# Patient Record
Sex: FEMALE | Race: BLACK OR AFRICAN AMERICAN | NOT HISPANIC OR LATINO | Employment: FULL TIME | URBAN - METROPOLITAN AREA
[De-identification: names, ages, dates, MRNs, and addresses within clinical notes are randomized per-mention and may not be internally consistent; named-entity substitution may affect disease eponyms.]

---

## 2019-03-18 ENCOUNTER — OFFICE VISIT (OUTPATIENT)
Dept: FAMILY MEDICINE | Facility: CLINIC | Age: 34
End: 2019-03-18
Payer: COMMERCIAL

## 2019-03-18 VITALS
OXYGEN SATURATION: 98 % | SYSTOLIC BLOOD PRESSURE: 140 MMHG | RESPIRATION RATE: 18 BRPM | WEIGHT: 150.3 LBS | BODY MASS INDEX: 28.37 KG/M2 | HEART RATE: 114 BPM | HEIGHT: 61 IN | DIASTOLIC BLOOD PRESSURE: 100 MMHG | TEMPERATURE: 99.7 F

## 2019-03-18 DIAGNOSIS — N91.2 AMENORRHEA: ICD-10-CM

## 2019-03-18 DIAGNOSIS — R74.8 ELEVATED LIVER ENZYMES: ICD-10-CM

## 2019-03-18 DIAGNOSIS — F17.200 SMOKING ADDICTION: ICD-10-CM

## 2019-03-18 DIAGNOSIS — G47.30 SLEEP APNEA, UNSPECIFIED TYPE: ICD-10-CM

## 2019-03-18 DIAGNOSIS — F41.9 ANXIETY: ICD-10-CM

## 2019-03-18 DIAGNOSIS — I10 ESSENTIAL HYPERTENSION: ICD-10-CM

## 2019-03-18 DIAGNOSIS — R42 DIZZINESS: Primary | ICD-10-CM

## 2019-03-18 DIAGNOSIS — F10.10 ALCOHOL ABUSE: ICD-10-CM

## 2019-03-18 DIAGNOSIS — L30.9 DERMATITIS: ICD-10-CM

## 2019-03-18 DIAGNOSIS — E66.3 OVERWEIGHT: ICD-10-CM

## 2019-03-18 DIAGNOSIS — E78.2 MIXED HYPERLIPIDEMIA: ICD-10-CM

## 2019-03-18 LAB
ALBUMIN SERPL-MCNC: 4.4 G/DL (ref 3.4–5)
ALP SERPL-CCNC: 37 U/L (ref 40–150)
ALT SERPL W P-5'-P-CCNC: 119 U/L (ref 0–50)
ANION GAP SERPL CALCULATED.3IONS-SCNC: 11 MMOL/L (ref 3–14)
AST SERPL W P-5'-P-CCNC: 158 U/L (ref 0–45)
B-HCG SERPL-ACNC: <1 IU/L (ref 0–5)
BILIRUB SERPL-MCNC: 0.5 MG/DL (ref 0.2–1.3)
BUN SERPL-MCNC: 6 MG/DL (ref 7–30)
CALCIUM SERPL-MCNC: 9.7 MG/DL (ref 8.5–10.1)
CHLORIDE SERPL-SCNC: 95 MMOL/L (ref 94–109)
CHOLEST SERPL-MCNC: 249 MG/DL
CO2 SERPL-SCNC: 29 MMOL/L (ref 20–32)
CREAT SERPL-MCNC: 0.7 MG/DL (ref 0.52–1.04)
ERYTHROCYTE [DISTWIDTH] IN BLOOD BY AUTOMATED COUNT: 12.7 % (ref 10–15)
GFR SERPL CREATININE-BSD FRML MDRD: >90 ML/MIN/{1.73_M2}
GLUCOSE SERPL-MCNC: 87 MG/DL (ref 70–99)
HCT VFR BLD AUTO: 40 % (ref 35–47)
HDLC SERPL-MCNC: 84 MG/DL
HGB BLD-MCNC: 13.4 G/DL (ref 11.7–15.7)
LDLC SERPL CALC-MCNC: 152 MG/DL
MCH RBC QN AUTO: 34.4 PG (ref 26.5–33)
MCHC RBC AUTO-ENTMCNC: 33.5 G/DL (ref 31.5–36.5)
MCV RBC AUTO: 103 FL (ref 78–100)
NONHDLC SERPL-MCNC: 165 MG/DL
PLATELET # BLD AUTO: 113 10E9/L (ref 150–450)
POTASSIUM SERPL-SCNC: 3.9 MMOL/L (ref 3.4–5.3)
PROT SERPL-MCNC: 8.9 G/DL (ref 6.8–8.8)
RBC # BLD AUTO: 3.9 10E12/L (ref 3.8–5.2)
SODIUM SERPL-SCNC: 135 MMOL/L (ref 133–144)
TRIGL SERPL-MCNC: 66 MG/DL
VIT B12 SERPL-MCNC: 789 PG/ML (ref 193–986)
WBC # BLD AUTO: 4 10E9/L (ref 4–11)

## 2019-03-18 PROCEDURE — 99204 OFFICE O/P NEW MOD 45 MIN: CPT | Performed by: NURSE PRACTITIONER

## 2019-03-18 PROCEDURE — 84702 CHORIONIC GONADOTROPIN TEST: CPT | Performed by: NURSE PRACTITIONER

## 2019-03-18 PROCEDURE — 80053 COMPREHEN METABOLIC PANEL: CPT | Performed by: NURSE PRACTITIONER

## 2019-03-18 PROCEDURE — 82607 VITAMIN B-12: CPT | Performed by: NURSE PRACTITIONER

## 2019-03-18 PROCEDURE — 85027 COMPLETE CBC AUTOMATED: CPT | Performed by: NURSE PRACTITIONER

## 2019-03-18 PROCEDURE — 36415 COLL VENOUS BLD VENIPUNCTURE: CPT | Performed by: NURSE PRACTITIONER

## 2019-03-18 PROCEDURE — 80061 LIPID PANEL: CPT | Performed by: NURSE PRACTITIONER

## 2019-03-18 RX ORDER — DESONIDE 0.5 MG/G
OINTMENT TOPICAL
Refills: 1 | COMMUNITY
Start: 2018-03-20 | End: 2019-03-18

## 2019-03-18 RX ORDER — ACETAMINOPHEN AND CODEINE PHOSPHATE 120; 12 MG/5ML; MG/5ML
SOLUTION ORAL
COMMUNITY
Start: 2018-03-08 | End: 2019-03-25

## 2019-03-18 RX ORDER — AMLODIPINE BESYLATE 5 MG/1
5 TABLET ORAL
COMMUNITY
Start: 2017-05-05 | End: 2019-03-25

## 2019-03-18 RX ORDER — HYDROCHLOROTHIAZIDE 25 MG/1
25 TABLET ORAL
COMMUNITY
End: 2019-03-25

## 2019-03-18 RX ORDER — TRIAMCINOLONE ACETONIDE 0.25 MG/G
OINTMENT TOPICAL 2 TIMES DAILY
Qty: 80 G | Refills: 1 | Status: SHIPPED | OUTPATIENT
Start: 2019-03-18 | End: 2020-07-27 | Stop reason: DRUGHIGH

## 2019-03-18 RX ORDER — ONDANSETRON 4 MG/1
TABLET, ORALLY DISINTEGRATING ORAL
Refills: 0 | COMMUNITY
Start: 2019-01-31 | End: 2019-08-19

## 2019-03-18 RX ORDER — CALCIPOTRIENE 50 UG/G
CREAM TOPICAL
COMMUNITY
Start: 2018-03-12 | End: 2019-03-18

## 2019-03-18 ASSESSMENT — MIFFLIN-ST. JEOR: SCORE: 1334.14

## 2019-03-18 ASSESSMENT — PAIN SCALES - GENERAL: PAINLEVEL: NO PAIN (0)

## 2019-03-18 NOTE — PATIENT INSTRUCTIONS
Check BP 2-3 times a week, call/return to clinic if consistently > 140/90    Work on cutting back on alcohol intake to 2-3 times during the week, on the weekends try to cut back at least a few drinks each night/day. Work together with your  if you both are on track and wanting to quit. Start walking outside, getting some exercise/fresh air to help keep your mind off drinking. The [facility] referral service will call in a few business days to talk about cutting back and getting help.     There is also AA or other resources in the community. [facility] can also offer other resources.

## 2019-03-18 NOTE — PROGRESS NOTES
SUBJECTIVE:   Srinath Stewrat is a 33 year old female who presents to clinic today for the following health issues:      New Patient/Transfer of Care    Concern - Shaky/Vomiting  Onset: intermittent but happened this saturday    Description: had drank the night before and the next day was vomiting, sweating, twitching    Intensity: moderate    Progression of Symptoms:  improving    Accompanying Signs & Symptoms:  Lightheaded, decrease in appetite, bilateral pain in lower abdomen    Previous history of similar problem:   yes    Precipitating factors:   Worsened by: could be from the drinking    Alleviating factors:  Improved by: valium-5 tabs from ER visit on 1/31/19    Therapies Tried and outcome: valium seems to help    Reviewed chart: Was in [place], drank a lot there in january, came back was having nausea/vomiting/'muscle contractions, went to ER 1/31/19-was given 5 tabs of Valium 5 mg.    Last summer had 'episode' where muscles were 'seizing up and twitching'. Went to ER. She got fluids and potassium. Noted to be likely panic attack at that time. She talked to her PCP then, her liver was 'fatty'. She notes sometimes after heavy drinking she feels like this. She ran out of those, that occurred again on 1/31/19. Her insurance changed so can't see her old PCP.     Last Saturday felt shaky, dizzy. Was heavily drinking Friday. Somedays she feels she has to drink alcohol to feel better. During the week will have 2 drinks (or more?) in the evening. Drinks daily-7 days a week. Binge drinking on weekends with . Usually eats dinner with  but not always. Sometimes snacks during the day or has only 1 meal. Not much of an appetite. Drinks [employer] 1.5 L vodka over the weekedn between her  and her.  She denies missing work due to her alcohol intake.    Through her other provider, had MRI, US of abdomen. Has fatty liver.  Also has history of elevated cholesterol.    No cigarettes but vapes.     Elevated BP-is  currently on 2 medications.  Had significant reaction to ACE inhibitors, had significant mouth swelling.  Not eating much, not sleeping much. Sometimes once a day. Sometimes snacks.   Sleep apnea-most nights it helps  Anxiety: not dx but did have panic attack last year.  She also feels her anxiety worsens the day after she has been heavily drinking.    Was seeing dermatology. desnide on skin. Said it was topical dermatitis-she is wondering if there is another medication to start because this 1 does not seem to be working as well now.  She gets a dermatitis around her mouth, on her hands.    Thinks due for pap next year    Feels a little lightheaded today, racing heart, muscle tension. Feels 'off' because she did not drink today. Yesterday she drank heavily.  She states she also tries to stop drinking by 9 PM so does not affect her the next day.    Works for [employer] is . Is busy season.  States work is going with her to get more busy soon.      Problem list and histories reviewed & adjusted, as indicated.  Additional history: as documented    Patient Active Problem List   Diagnosis     Dizziness     Essential hypertension     Alcohol abuse     Mixed hyperlipidemia     Dermatitis     Sleep apnea, unspecified type     Smoking addiction     Anxiety     Elevated liver enzymes     Past Surgical History:   Procedure Laterality Date     HERNIA REPAIR  2005       Social History     Tobacco Use     Smoking status: Never Smoker     Smokeless tobacco: Current User   Substance Use Topics     Alcohol use: Yes     Family History   Problem Relation Age of Onset     Hypertension Father      Other - See Comments Father         brain anyurism dx 2018     Alcoholism Father         hasn't drank since 2018     Depression Maternal Grandmother      Asthma Nephew      Eczema Nephew          Current Outpatient Medications   Medication Sig Dispense Refill     amLODIPine (NORVASC) 5 MG tablet Take 5 mg by mouth        "hydrochlorothiazide (HYDRODIURIL) 25 MG tablet Take 25 mg by mouth       norethindrone (SELVIN) 0.35 MG tablet        ondansetron (ZOFRAN-ODT) 4 MG ODT tab DISSOLVE 1 TABLET ON TONGUE EVERY 8 HOURS AS NEEDED  0     triamcinolone (KENALOG) 0.025 % external ointment Apply topically 2 times daily 80 g 1     Allergies   Allergen Reactions     Ace Inhibitors Swelling     Lisinopril       Reviewed and updated as needed this visit by clinical staff  Tobacco  Allergies  Meds  Problems  Med Hx  Surg Hx  Fam Hx  Soc Hx        Reviewed and updated as needed this visit by Provider  Tobacco  Allergies  Meds  Problems  Med Hx  Surg Hx  Fam Hx         ROS:  Constitutional, HEENT, cardiovascular, pulmonary, gi and gu, psych as above, MS as above, skin as above, systems are negative, except as otherwise noted.    OBJECTIVE:     BP (!) 140/100 (BP Location: Right arm, Patient Position: Sitting, Cuff Size: Adult Regular)   Pulse 114   Temp 99.7  F (37.6  C) (Oral)   Resp 18   Ht 1.549 m (5' 1\")   Wt 68.2 kg (150 lb 4.8 oz)   LMP 02/28/2019   SpO2 98%   BMI 28.40 kg/m    Body mass index is 28.4 kg/m .  GENERAL: healthy, alert and no distress  EYES: Eyes grossly normal to inspection, PERRL and conjunctivae and sclerae normal  HENT: ear canals and TM's normal, nose and mouth without ulcers or lesions  NECK: no adenopathy, no asymmetry, masses, or scars and thyroid normal to palpation  RESP: lungs clear to auscultation - no rales, rhonchi or wheezes  CV: regular rate and rhythm, normal S1 S2, no S3 or S4, no murmur, click or rub  ABDOMEN: soft, slight tenderness lower abdomen, no hepatosplenomegaly, no masses and bowel sounds normal  MS: no gross musculoskeletal defects noted, patient seems slightly shaky  NEURO: Normal strength and tone, mentation intact and speech normal  PSYCH: mentation appears normal, affect normal but states she is anxiety   Skin: Dry patches noted on hands and fingers, slight dry patch noted " around the mouth    Diagnostic Test Results:  Results for orders placed or performed in visit on 03/18/19 (from the past 24 hour(s))   Comprehensive metabolic panel (BMP + Alb, Alk Phos, ALT, AST, Total. Bili, TP)   Result Value Ref Range    Sodium 135 133 - 144 mmol/L    Potassium 3.9 3.4 - 5.3 mmol/L    Chloride 95 94 - 109 mmol/L    Carbon Dioxide 29 20 - 32 mmol/L    Anion Gap 11 3 - 14 mmol/L    Glucose 87 70 - 99 mg/dL    Urea Nitrogen 6 (L) 7 - 30 mg/dL    Creatinine 0.70 0.52 - 1.04 mg/dL    GFR Estimate >90 >60 mL/min/[1.73_m2]    GFR Estimate If Black >90 >60 mL/min/[1.73_m2]    Calcium 9.7 8.5 - 10.1 mg/dL    Bilirubin Total 0.5 0.2 - 1.3 mg/dL    Albumin 4.4 3.4 - 5.0 g/dL    Protein Total 8.9 (H) 6.8 - 8.8 g/dL    Alkaline Phosphatase 37 (L) 40 - 150 U/L     (H) 0 - 50 U/L     (H) 0 - 45 U/L   Vitamin B12   Result Value Ref Range    Vitamin B12 789 193 - 986 pg/mL   Lipid panel reflex to direct LDL Fasting   Result Value Ref Range    Cholesterol 249 (H) <200 mg/dL    Triglycerides 66 <150 mg/dL    HDL Cholesterol 84 >49 mg/dL    LDL Cholesterol Calculated 152 (H) <100 mg/dL    Non HDL Cholesterol 165 (H) <130 mg/dL   CBC with platelets   Result Value Ref Range    WBC 4.0 4.0 - 11.0 10e9/L    RBC Count 3.90 3.8 - 5.2 10e12/L    Hemoglobin 13.4 11.7 - 15.7 g/dL    Hematocrit 40.0 35.0 - 47.0 %     (H) 78 - 100 fl    MCH 34.4 (H) 26.5 - 33.0 pg    MCHC 33.5 31.5 - 36.5 g/dL    RDW 12.7 10.0 - 15.0 %    Platelet Count 113 (L) 150 - 450 10e9/L   HCG quantitative pregnancy   Result Value Ref Range    HCG Quantitative Serum <1 0 - 5 IU/L       ASSESSMENT/PLAN:     Hyperlipidemia; controlled   Plan:  No changes in the patient's current treatment plan    Hypertension; much worse   Associated with the following complications:    None   Plan:  No changes in the patient's current treatment plan-she needs to return in 3 months.  Then we can decide if we need to make medication  "adjustments.        BMI:   Estimated body mass index is 28.4 kg/m  as calculated from the following:    Height as of this encounter: 1.549 m (5' 1\").    Weight as of this encounter: 68.2 kg (150 lb 4.8 oz).   Weight management plan: did not discuss this time, need to next time      1. Dizziness  Likely related to alcohol withdrawal.  Check lab work.  Patient okay with all of the labs rechecked today.  - CBC with platelets    2. Essential hypertension  Blood pressure still high, check renal in liver function.  Advised to start eating healthy, getting some exercise, and cutting back on her alcohol.   - Comprehensive metabolic panel (BMP + Alb, Alk Phos, ALT, AST, Total. Bili, TP)    3. Alcohol abuse  Check labs.  She states she is ready to cut back on alcohol intake.  She reports her  also would be willing to do so.  She is not going to succeed unless her  also wants to cut back.  We will give referral for mental health in day treatment.   - Vitamin B12  - MENTAL HEALTH REFERRAL  - Adult; Outpatient Treatment; Chemical Dependency Treatment; [facility]: [facility] [phone]; We will contact you to schedule the appointment or please call with any questions    4. Mixed hyperlipidemia  She has elevated cholesterol.  She needs to work on eating healthy and getting exercise.  We can recheck this in about 4-6 months.  - Lipid panel reflex to direct LDL Fasting    5. Dermatitis  Stop with her medication, trial this for dermatitis  - triamcinolone (KENALOG) 0.025 % external ointment; Apply topically 2 times daily  Dispense: 80 g; Refill: 1    6. Amenorrhea  Screen for pregnancy, and it was negative.  Her period is overdue by almost 10 days.  - HCG quantitative pregnancy    7. Sleep apnea, unspecified type  States she uses her CPAP.  Could be contributing to her high blood pressure, although suspect today is due to alcohol withdrawal    8. Smoking addiction  Currently she no longer smokes cigarettes " but vapes.  Did not discuss this but need to in the future as vaping is not healthy eater.    9. Anxiety  She has had increased anxiety she is noted over the past year.  She feels more anxious it seems the day after heavy drinking.  Advised to cut back on binge drinking.  We will try hydroxyzine for her anxiety.    10 elevated liver enzymes.   Unsure what her levels were prior to this.  Repeat levels in 2 months but certainly could push out to 3 months if she returns then for blood pressure check-otherwise can do both in 2-3 months..  Advised patient to cut back significantly on her alcohol intake, as this is likely related to that.  She also has high cholesterol, which can be contributing.     11 overweight.   Did not discuss this today, but should next time.  The focus should be eating more small frequent meals and exercising    FUTURE APPOINTMENTS:       - Follow-up visit in 3 months, sooner if needed.    CASANDRA Méndez, NP-C  [facility]    This chart was documented by provider using a voice activated software called Dragon in addition to manual typing. There may be vocabulary errors or other grammatical errors due to this.

## 2019-03-19 PROBLEM — R74.8 ELEVATED LIVER ENZYMES: Status: ACTIVE | Noted: 2019-03-19

## 2019-03-19 PROBLEM — F17.200 SMOKING ADDICTION: Status: ACTIVE | Noted: 2019-03-19

## 2019-03-19 PROBLEM — G47.30 SLEEP APNEA, UNSPECIFIED TYPE: Status: ACTIVE | Noted: 2019-03-19

## 2019-03-19 PROBLEM — F41.9 ANXIETY: Status: ACTIVE | Noted: 2019-03-19

## 2019-03-22 ENCOUNTER — HEALTH MAINTENANCE LETTER (OUTPATIENT)
Age: 34
End: 2019-03-22

## 2019-03-25 ENCOUNTER — E-VISIT (OUTPATIENT)
Dept: FAMILY MEDICINE | Facility: CLINIC | Age: 34
End: 2019-03-25
Payer: COMMERCIAL

## 2019-03-25 ENCOUNTER — MYC MEDICAL ADVICE (OUTPATIENT)
Dept: FAMILY MEDICINE | Facility: CLINIC | Age: 34
End: 2019-03-25

## 2019-03-25 DIAGNOSIS — N30.00 ACUTE CYSTITIS WITHOUT HEMATURIA: Primary | ICD-10-CM

## 2019-03-25 PROCEDURE — 99444 ZZC PHYSICIAN ONLINE EVALUATION & MANAGEMENT SERVICE: CPT | Performed by: NURSE PRACTITIONER

## 2019-03-25 RX ORDER — SULFAMETHOXAZOLE/TRIMETHOPRIM 800-160 MG
1 TABLET ORAL 2 TIMES DAILY
Qty: 10 TABLET | Refills: 0 | Status: SHIPPED | OUTPATIENT
Start: 2019-03-25 | End: 2019-08-19

## 2019-05-28 ENCOUNTER — MYC REFILL (OUTPATIENT)
Dept: FAMILY MEDICINE | Facility: CLINIC | Age: 34
End: 2019-05-28

## 2019-05-28 DIAGNOSIS — I10 ESSENTIAL HYPERTENSION: ICD-10-CM

## 2019-05-28 DIAGNOSIS — Z30.011 ENCOUNTER FOR INITIAL PRESCRIPTION OF CONTRACEPTIVE PILLS: ICD-10-CM

## 2019-05-28 NOTE — TELEPHONE ENCOUNTER
"Requested Prescriptions   Pending Prescriptions Disp Refills     norethindrone (SELVIN) 0.35 MG tablet  Last Written Prescription Date:  3/25/19  Last Fill Quantity: 28 tablet,  # refills: 11   Last office visit: 3/18/2019 with prescribing provider:  April Chang NP   Future Office Visit:     28 tablet 11     Sig: Take 1 tablet (0.35 mg) by mouth daily       Contraceptives Protocol Passed - 5/28/2019  2:19 PM        Passed - Patient is not a current smoker if age is 35 or older        Passed - Recent (12 mo) or future (30 days) visit within the authorizing provider's specialty     Patient had office visit in the last 12 months or has a visit in the next 30 days with authorizing provider or within the authorizing provider's specialty.  See \"Patient Info\" tab in inbasket, or \"Choose Columns\" in Meds & Orders section of the refill encounter.              Passed - Medication is active on med list        Passed - No active pregnancy on record        Passed - No positive pregnancy test in past 12 months            hydrochlorothiazide (HYDRODIURIL) 25 MG tablet  Last Written Prescription Date:  3/25/19  Last Fill Quantity: 90 tablet,  # refills: 0   Last office visit: 3/18/2019 with prescribing provider:  April Chang NP   Future Office Visit:     90 tablet 0     Sig: Take 1 tablet (25 mg) by mouth daily       Diuretics (Including Combos) Protocol Failed - 5/28/2019  2:19 PM        Failed - Blood pressure under 140/90 in past 12 months     BP Readings from Last 3 Encounters:   03/18/19 (!) 140/100                 Passed - Recent (12 mo) or future (30 days) visit within the authorizing provider's specialty     Patient had office visit in the last 12 months or has a visit in the next 30 days with authorizing provider or within the authorizing provider's specialty.  See \"Patient Info\" tab in inbasket, or \"Choose Columns\" in Meds & Orders section of the refill encounter.              Passed - Medication is active on med list        " "Passed - Patient is age 18 or older        Passed - No active pregancy on record        Passed - Normal serum creatinine on file in past 12 months     Recent Labs   Lab Test 03/18/19  1443   CR 0.70              Passed - Normal serum potassium on file in past 12 months     Recent Labs   Lab Test 03/18/19  1443   POTASSIUM 3.9                    Passed - Normal serum sodium on file in past 12 months     Recent Labs   Lab Test 03/18/19  1443                 Passed - No positive pregnancy test in past 12 months            amLODIPine (NORVASC) 5 MG tablet  Last Written Prescription Date:  3/25/19  Last Fill Quantity: 90 tablet,  # refills: 0   Last office visit: 3/18/2019 with prescribing provider:  April Chang NP   Future Office Visit:     90 tablet 0     Sig: Take 1 tablet (5 mg) by mouth daily       Calcium Channel Blockers Protocol  Failed - 5/28/2019  2:19 PM        Failed - Blood pressure under 140/90 in past 12 months     BP Readings from Last 3 Encounters:   03/18/19 (!) 140/100                 Passed - Recent (12 mo) or future (30 days) visit within the authorizing provider's specialty     Patient had office visit in the last 12 months or has a visit in the next 30 days with authorizing provider or within the authorizing provider's specialty.  See \"Patient Info\" tab in inbasket, or \"Choose Columns\" in Meds & Orders section of the refill encounter.              Passed - Medication is active on med list        Passed - Patient is age 18 or older        Passed - No active pregnancy on record        Passed - Normal serum creatinine on file in past 12 months     Recent Labs   Lab Test 03/18/19  1443   CR 0.70             Passed - No positive pregnancy test in past 12 months          "

## 2019-05-30 RX ORDER — AMLODIPINE BESYLATE 5 MG/1
5 TABLET ORAL DAILY
Qty: 90 TABLET | Refills: 0 | Status: SHIPPED | OUTPATIENT
Start: 2019-05-30 | End: 2020-05-01

## 2019-05-30 RX ORDER — ACETAMINOPHEN AND CODEINE PHOSPHATE 120; 12 MG/5ML; MG/5ML
0.35 SOLUTION ORAL DAILY
Qty: 28 TABLET | Refills: 9 | Status: SHIPPED | OUTPATIENT
Start: 2019-05-30 | End: 2019-08-19

## 2019-05-30 RX ORDER — HYDROCHLOROTHIAZIDE 25 MG/1
25 TABLET ORAL DAILY
Qty: 90 TABLET | Refills: 0 | Status: SHIPPED | OUTPATIENT
Start: 2019-05-30 | End: 2019-09-03

## 2019-05-30 NOTE — TELEPHONE ENCOUNTER
Different pharmacy being requested for refill than previously sent to. Previously sent to mail order pharmacy.     Prescription approved per [facility] Refill Protocol.        Isaiah Cantu RN, BSN, PHN

## 2019-08-15 ENCOUNTER — MYC REFILL (OUTPATIENT)
Dept: FAMILY MEDICINE | Facility: CLINIC | Age: 34
End: 2019-08-15

## 2019-08-15 DIAGNOSIS — Z30.011 ENCOUNTER FOR INITIAL PRESCRIPTION OF CONTRACEPTIVE PILLS: ICD-10-CM

## 2019-08-16 NOTE — TELEPHONE ENCOUNTER
"Requested Prescriptions   Pending Prescriptions Disp Refills     norethindrone (SELVIN) 0.35 MG tablet  Last Written Prescription Date:  5/30/19  Last Fill Quantity: 28 tablet,  # refills: 3   Last office visit: 3/18/2019 with prescribing provider:  April Chang NP   Future Office Visit:   Next 5 appointments (look out 90 days)    Aug 19, 2019  1:00 PM CDT  PHYSICAL with April Chang NP  [facility] [address] [place] [id number] [phone]          28 tablet 9     Sig: Take 1 tablet (0.35 mg) by mouth daily       Contraceptives Protocol Passed - 8/16/2019  6:31 AM        Passed - Patient is not a current smoker if age is 35 or older        Passed - Recent (12 mo) or future (30 days) visit within the authorizing provider's specialty     Patient had office visit in the last 12 months or has a visit in the next 30 days with authorizing provider or within the authorizing provider's specialty.  See \"Patient Info\" tab in inbasket, or \"Choose Columns\" in Meds & Orders section of the refill encounter.              Passed - Medication is active on med list        Passed - No active pregnancy on record        Passed - No positive pregnancy test in past 12 months          "

## 2019-08-19 ENCOUNTER — OFFICE VISIT (OUTPATIENT)
Dept: FAMILY MEDICINE | Facility: CLINIC | Age: 34
End: 2019-08-19
Payer: COMMERCIAL

## 2019-08-19 VITALS
TEMPERATURE: 98.6 F | OXYGEN SATURATION: 99 % | HEART RATE: 98 BPM | DIASTOLIC BLOOD PRESSURE: 88 MMHG | SYSTOLIC BLOOD PRESSURE: 128 MMHG | BODY MASS INDEX: 27.19 KG/M2 | RESPIRATION RATE: 16 BRPM | WEIGHT: 144 LBS | HEIGHT: 61 IN

## 2019-08-19 DIAGNOSIS — R74.8 ELEVATED LIVER ENZYMES: ICD-10-CM

## 2019-08-19 DIAGNOSIS — G47.30 SLEEP APNEA, UNSPECIFIED TYPE: ICD-10-CM

## 2019-08-19 DIAGNOSIS — I10 ESSENTIAL HYPERTENSION: ICD-10-CM

## 2019-08-19 DIAGNOSIS — F10.10 ALCOHOL ABUSE: ICD-10-CM

## 2019-08-19 DIAGNOSIS — Z00.00 ROUTINE GENERAL MEDICAL EXAMINATION AT A HEALTH CARE FACILITY: Primary | ICD-10-CM

## 2019-08-19 DIAGNOSIS — L30.9 DERMATITIS: ICD-10-CM

## 2019-08-19 DIAGNOSIS — Z30.011 ENCOUNTER FOR INITIAL PRESCRIPTION OF CONTRACEPTIVE PILLS: ICD-10-CM

## 2019-08-19 LAB
ALBUMIN SERPL-MCNC: 4.3 G/DL (ref 3.4–5)
ALP SERPL-CCNC: 40 U/L (ref 40–150)
ALT SERPL W P-5'-P-CCNC: 127 U/L (ref 0–50)
ANION GAP SERPL CALCULATED.3IONS-SCNC: 10 MMOL/L (ref 3–14)
AST SERPL W P-5'-P-CCNC: 157 U/L (ref 0–45)
BILIRUB SERPL-MCNC: 0.5 MG/DL (ref 0.2–1.3)
BUN SERPL-MCNC: 8 MG/DL (ref 7–30)
CALCIUM SERPL-MCNC: 9.4 MG/DL (ref 8.5–10.1)
CHLORIDE SERPL-SCNC: 98 MMOL/L (ref 94–109)
CHOLEST SERPL-MCNC: 277 MG/DL
CO2 SERPL-SCNC: 29 MMOL/L (ref 20–32)
CREAT SERPL-MCNC: 0.82 MG/DL (ref 0.52–1.04)
GFR SERPL CREATININE-BSD FRML MDRD: >90 ML/MIN/{1.73_M2}
GLUCOSE SERPL-MCNC: 79 MG/DL (ref 70–99)
HDLC SERPL-MCNC: 126 MG/DL
LDLC SERPL CALC-MCNC: 135 MG/DL
NONHDLC SERPL-MCNC: 151 MG/DL
POTASSIUM SERPL-SCNC: 3.5 MMOL/L (ref 3.4–5.3)
PROT SERPL-MCNC: 8.9 G/DL (ref 6.8–8.8)
SODIUM SERPL-SCNC: 137 MMOL/L (ref 133–144)
TRIGL SERPL-MCNC: 79 MG/DL

## 2019-08-19 PROCEDURE — 99395 PREV VISIT EST AGE 18-39: CPT | Performed by: NURSE PRACTITIONER

## 2019-08-19 PROCEDURE — 36415 COLL VENOUS BLD VENIPUNCTURE: CPT | Performed by: NURSE PRACTITIONER

## 2019-08-19 PROCEDURE — 80061 LIPID PANEL: CPT | Performed by: NURSE PRACTITIONER

## 2019-08-19 PROCEDURE — 80053 COMPREHEN METABOLIC PANEL: CPT | Performed by: NURSE PRACTITIONER

## 2019-08-19 RX ORDER — ACETAMINOPHEN AND CODEINE PHOSPHATE 120; 12 MG/5ML; MG/5ML
0.35 SOLUTION ORAL DAILY
Qty: 28 TABLET | Refills: 9 | OUTPATIENT
Start: 2019-08-19

## 2019-08-19 RX ORDER — ACETAMINOPHEN AND CODEINE PHOSPHATE 120; 12 MG/5ML; MG/5ML
0.35 SOLUTION ORAL DAILY
Qty: 28 TABLET | Refills: 11 | Status: SHIPPED | OUTPATIENT
Start: 2019-08-19 | End: 2020-07-27

## 2019-08-19 ASSESSMENT — MIFFLIN-ST. JEOR: SCORE: 1305.56

## 2019-08-19 NOTE — TELEPHONE ENCOUNTER
"Requested Prescriptions   Pending Prescriptions Disp Refills     norethindrone (SELVIN) 0.35 MG tablet 28 tablet 9     Sig: Take 1 tablet (0.35 mg) by mouth daily       Contraceptives Protocol Passed - 8/16/2019  8:09 AM        Passed - Patient is not a current smoker if age is 35 or older        Passed - Recent (12 mo) or future (30 days) visit within the authorizing provider's specialty     Patient had office visit in the last 12 months or has a visit in the next 30 days with authorizing provider or within the authorizing provider's specialty.  See \"Patient Info\" tab in inbasket, or \"Choose Columns\" in Meds & Orders section of the refill encounter.              Passed - Medication is active on med list        Passed - No active pregnancy on record        Passed - No positive pregnancy test in past 12 months        Pt has refills.     Pt has 11 refills as of 5/28/2019  Aruna Moran RN on 8/19/2019 at 9:29 AM      "

## 2019-08-19 NOTE — PATIENT INSTRUCTIONS
Preventive Health Recommendations  Female Ages 26 - 39  Yearly exam:   See your health care provider every year in order to    Review health changes.     Discuss preventive care.      Review your medicines if you your doctor has prescribed any.    Until age 30: Get a Pap test every three years (more often if you have had an abnormal result).    After age 30: Talk to your doctor about whether you should have a Pap test every 3 years or have a Pap test with HPV screening every 5 years.   You do not need a Pap test if your uterus was removed (hysterectomy) and you have not had cancer.  You should be tested each year for STDs (sexually transmitted diseases), if you're at risk.   Talk to your provider about how often to have your cholesterol checked.  If you are at risk for diabetes, you should have a diabetes test (fasting glucose).  Shots: Get a flu shot each year. Get a tetanus shot every 10 years.   Nutrition:     Eat at least 5 servings of fruits and vegetables each day.    Eat whole-grain bread, whole-wheat pasta and brown rice instead of white grains and rice.    Get adequate Calcium and Vitamin D.     Lifestyle    Exercise at least 150 minutes a week (30 minutes a day, 5 days of the week). This will help you control your weight and prevent disease.    Limit alcohol to one drink per day.    No smoking.     Wear sunscreen to prevent skin cancer.    See your dentist every six months for an exam and cleaning.      At [facility], we strive to deliver an exceptional experience to you, every time we see you.  If you receive a survey in the mail, please send us back your thoughts. We really do value your feedback.    Your care team:     Family Medicine   POLO Slater MD Emily Bunt, APRN CNP   S. MD Ivory Lind MD Angela Wermerskirchen, MD         Clinic hours: Monday - Wednesday 7 am-7 pm   Thursdays and Fridays 7 am-5 pm.     [facility]: Monday - Friday 11 am-9 pm,   Saturday and Sunday 9 am-5 pm.    [facility]: Monday -Thursday 8 am-8 pm; Friday 8 am-6 pm; Saturday and Sunday 9 am-5 pm.     [facility]: Monday - Thursday 8 am - 7 pm; Friday 8 am - 6 pm    Clinic: [phone]   [facility]: [phone]   [facility]: [phone]

## 2019-08-19 NOTE — PROGRESS NOTES
"   SUBJECTIVE:   CC: Srinath Stewart is an 33 year old woman who presents for preventive health visit.     Healthy Habits:    Do you get at least three servings of calcium containing foods daily (dairy, green leafy vegetables, etc.)? yes    Amount of exercise or daily activities, outside of work: 1 day(s) per week, walking halls of work but no actual exercise. Advised she should start, even walking after dinner    Problems taking medications regularly No    Medication side effects: No    Have you had an eye exam in the past two years? yes    Do you see a dentist twice per year? yes    Do you have sleep apnea, excessive snoring or daytime drowsiness?yes, sleep apnea     Cut back on alcohol some, is eating a little more. Sometimes gets shaky. Has a few drinks nightly, on weekends more. Sometimes doesn't eat at all during the day stating \" water or coffee fills me up\". We talked about malnutrition, especially with her drinking excessive amounts of alcohol. Encouraged her to try eating 1 meal a day plus snacks. I think having her try and do 3 meals a day is too much for her at this point.     Works at [employer] . Looking to maybe move out of [facility]. Is certified [employer].  Maybe move to [place] next year.    HTN: stable. Call for refills.     Pap last done in 2017, due in 2020    Use steroid cream prn    Sleep apnea: uses cpap machine.was getting through [facility] medical. Would like referral via [facility] so things can get covered.     Vape: throughout the day. States she smokes less now with vaping than with cigarettes. Advised that it is still bad for her health but we will focus on increasing food intake and decreasing alcohol intake at this time.         Today's PHQ-2 Score:   PHQ-2 ( 1999 Pfizer) 8/19/2019   Q1: Little interest or pleasure in doing things 0   Q2: Feeling down, depressed or hopeless 0   PHQ-2 Score 0       Abuse: Current or Past(Physical, Sexual or Emotional)- No  Do " "you feel safe in your environment? Yes    Social History     Tobacco Use     Smoking status: Never Smoker     Smokeless tobacco: Current User   Substance Use Topics     Alcohol use: Yes     If you drink alcohol do you typically have >3 drinks per day or >7 drinks per week? No                     Reviewed orders with patient.  Reviewed health maintenance and updated orders accordingly - Yes  Lab work is in process    Mammogram not appropriate for this patient based on age.    Pertinent mammograms are reviewed under the imaging tab.  History of abnormal Pap smear: NO - age 30-65 PAP every 5 years with negative HPV co-testing recommended     Reviewed and updated as needed this visit by clinical staff  Tobacco  Allergies  Meds  Med Hx  Surg Hx  Fam Hx  Soc Hx        Reviewed and updated as needed this visit by Provider            ROS:  CONSTITUTIONAL: NEGATIVE for fever, chills, change in weight  INTEGUMENTARU/SKIN: NEGATIVE for worrisome rashes, moles or lesions  EYES: NEGATIVE for vision changes or irritation  ENT: NEGATIVE for ear, mouth and throat problems  RESP: NEGATIVE for significant cough or SOB  BREAST: NEGATIVE for masses, tenderness or discharge  CV: NEGATIVE for chest pain, palpitations or peripheral edema  GI: NEGATIVE for nausea, abdominal pain, heartburn, or change in bowel habits  : NEGATIVE for unusual urinary or vaginal symptoms. Periods are regular.  MUSCULOSKELETAL: NEGATIVE for significant arthralgias or myalgia  NEURO: NEGATIVE for weakness, dizziness or paresthesias  PSYCHIATRIC: NEGATIVE for changes in mood or affect    OBJECTIVE:   Ht 1.549 m (5' 1\")   Wt 65.3 kg (144 lb)   BMI 27.21 kg/m    EXAM:  GENERAL: healthy, alert and no distress  EYES: Eyes grossly normal to inspection, PERRL and conjunctivae and sclerae normal  HENT: ear canals and TM's normal, nose and mouth without ulcers or lesions  NECK: no adenopathy, no asymmetry, masses, or scars and thyroid normal to palpation  RESP: " lungs clear to auscultation - no rales, rhonchi or wheezes  BREAST: normal without masses, tenderness or nipple discharge and no palpable axillary masses or adenopathy  CV: regular rate and rhythm, normal S1 S2, no S3 or S4, no murmur, click or rub  ABDOMEN: soft, nontender, no hepatosplenomegaly, no masses and bowel sounds normal  MS: no gross musculoskeletal defects noted, no edema  SKIN: no suspicious lesions or rashes  NEURO: Normal strength and tone, mentation intact and speech normal  PSYCH: mentation appears normal, affect normal/bright    Diagnostic Test Results:  Labs reviewed in Epic  Results for orders placed or performed in visit on 08/19/19 (from the past 24 hour(s))   Lipid panel reflex to direct LDL Fasting   Result Value Ref Range    Cholesterol 277 (H) <200 mg/dL    Triglycerides 79 <150 mg/dL    HDL Cholesterol 126 >49 mg/dL    LDL Cholesterol Calculated 135 (H) <100 mg/dL    Non HDL Cholesterol 151 (H) <130 mg/dL   Comprehensive metabolic panel (BMP + Alb, Alk Phos, ALT, AST, Total. Bili, TP)   Result Value Ref Range    Sodium 137 133 - 144 mmol/L    Potassium 3.5 3.4 - 5.3 mmol/L    Chloride 98 94 - 109 mmol/L    Carbon Dioxide 29 20 - 32 mmol/L    Anion Gap 10 3 - 14 mmol/L    Glucose 79 70 - 99 mg/dL    Urea Nitrogen 8 7 - 30 mg/dL    Creatinine 0.82 0.52 - 1.04 mg/dL    GFR Estimate >90 >60 mL/min/[1.73_m2]    GFR Estimate If Black >90 >60 mL/min/[1.73_m2]    Calcium 9.4 8.5 - 10.1 mg/dL    Bilirubin Total 0.5 0.2 - 1.3 mg/dL    Albumin 4.3 3.4 - 5.0 g/dL    Protein Total 8.9 (H) 6.8 - 8.8 g/dL    Alkaline Phosphatase 40 40 - 150 U/L     (H) 0 - 50 U/L     (H) 0 - 45 U/L       ASSESSMENT/PLAN:   1. Routine general medical examination at a health care facility  Normal exam today  - Lipid panel reflex to direct LDL Fasting  - Comprehensive metabolic panel (BMP + Alb, Alk Phos, ALT, AST, Total. Bili, TP)    2. Encounter for initial prescription of contraceptive pills  Refill  "given  - norethindrone (SELVIN) 0.35 MG tablet; Take 1 tablet (0.35 mg) by mouth daily  Dispense: 28 tablet; Refill: 11    3. Sleep apnea, unspecified type  Follow up with Sleep eval to get more parts of cpap machine, she likely does not need new sleep study but will leave that up to sleep specialists.   - SLEEP EVALUATION & MANAGEMENT REFERRAL - ADULT -[facility] [place] - [phone]; Future    4. Elevated liver enzymes  Send Mychart message about enzymes still being high. She needs to decrease alcohol intake to improve the numbers, is at risk for alcohol induced liver disease if she doesn't make changes or if the levels continue to elevate. recommended to return in 3 months for follow up in person and recheck liver enzymes.     5. Alcohol abuse  States she cut back on alcohol but still feels \"shaky\" at times. Has a few drinks a night, more on the weekends. Sounds like her  also drinks excessively with her. She doesn't seem motivated to cut back at this time but will continue to encourage her to do so    6. Dermatitis  Call for refill of steroid cream    7. Essential hypertension  Stable, call for refills      COUNSELING:   Reviewed preventive health counseling, as reflected in patient instructions    Estimated body mass index is 27.21 kg/m  as calculated from the following:    Height as of this encounter: 1.549 m (5' 1\").    Weight as of this encounter: 65.3 kg (144 lb).         reports that she has never smoked. She uses smokeless tobacco.  Tobacco Cessation Action Plan: Information offered: Patient not interested at this time    Counseling Resources:  ATP IV Guidelines  Pooled Cohorts Equation Calculator  Breast Cancer Risk Calculator  FRAX Risk Assessment  ICSI Preventive Guidelines  Dietary Guidelines for Americans, 2010  USDA's MyPlate  ASA Prophylaxis  Lung CA Screening    Return 1 year for physical but in 3 months for repeat liver enzymes and meet with provider      April" CASANDRA Chang, NP-C  [facility]

## 2019-08-29 DIAGNOSIS — I10 ESSENTIAL HYPERTENSION: ICD-10-CM

## 2019-08-29 NOTE — TELEPHONE ENCOUNTER
"Requested Prescriptions   Pending Prescriptions Disp Refills     hydrochlorothiazide (HYDRODIURIL) 25 MG tablet 90 tablet 0     Sig: Take 1 tablet (25 mg) by mouth daily       Diuretics (Including Combos) Protocol Passed - 8/29/2019 11:07 AM        Passed - Blood pressure under 140/90 in past 12 months     BP Readings from Last 3 Encounters:   08/19/19 128/88   03/18/19 (!) 140/100                 Passed - Recent (12 mo) or future (30 days) visit within the authorizing provider's specialty     Patient had office visit in the last 12 months or has a visit in the next 30 days with authorizing provider or within the authorizing provider's specialty.  See \"Patient Info\" tab in inbasket, or \"Choose Columns\" in Meds & Orders section of the refill encounter.              Passed - Medication is active on med list        Passed - Patient is age 18 or older        Passed - No active pregancy on record        Passed - Normal serum creatinine on file in past 12 months     Recent Labs   Lab Test 08/19/19  1339   CR 0.82              Passed - Normal serum potassium on file in past 12 months     Recent Labs   Lab Test 08/19/19  1339   POTASSIUM 3.5                    Passed - Normal serum sodium on file in past 12 months     Recent Labs   Lab Test 08/19/19  1339                 Passed - No positive pregnancy test in past 12 months        hydrochlorothiazide (HYDRODIURIL) 25 MG tablet  Last Written Prescription Date:  5/30/19  Last Fill Quantity: 90,  # refills: 0   Last office visit: 8/19/2019 with prescribing provider:  April Chang   Future Office Visit:      "

## 2019-09-03 RX ORDER — HYDROCHLOROTHIAZIDE 25 MG/1
25 TABLET ORAL DAILY
Qty: 90 TABLET | Refills: 2 | Status: SHIPPED | OUTPATIENT
Start: 2019-09-03 | End: 2020-07-27

## 2019-09-03 NOTE — TELEPHONE ENCOUNTER
Prescription approved per [facility] Refill Protocol.  Katelyn Benjamin RN

## 2019-09-09 ENCOUNTER — HOSPITAL ENCOUNTER (OUTPATIENT)
Dept: BEHAVIORAL HEALTH | Facility: CLINIC | Age: 34
Discharge: HOME | End: 2019-09-09
Attending: SOCIAL WORKER | Admitting: SOCIAL WORKER
Payer: COMMERCIAL

## 2019-09-09 PROCEDURE — H0001 ALCOHOL AND/OR DRUG ASSESS: HCPCS

## 2019-09-09 NOTE — PROGRESS NOTES
[facility]   [address] [place] [id number]        ADULT CD ASSESSMENT ADDENDUM      Patient Name: Srinath Stewart  Cell Phone:   Home: [phone] (home)    Mobile:   Telephone Information:   Mobile [phone]       Email:  [email or web address]  Emergency Contact: Jeff Stewart   Tel: [phone]    The patient reported being:      With which race do you identify? / Black    Initial Screening Questions     1. Are you currently having severe withdrawal symptoms that are putting yourself or others in danger?  No    2. Are you currently having severe medical problems that require immediate attention?  No    3. Are you currently having severe emotional or behavioral problems that are putting yourself or others at risk of harm?  No    4. Do you have sufficient reading skills that will enable you to understand written materials, including the program rules and client rights materials?  Yes     Family History and other additional information     Who raised you? (parents, grandparents, adoptive parents, step-parents, etc.)    Both Parents    Please tell me what it was like growing up in your family. (please include any history of substance abuse, mental health issues, emotional/physical/sexual abuse, forms of discipline, and support)     Per patient: raised by parents mother and father living, one sister and one brother living, father-alcohol, pretty chill,  when I was about 14. Raised by mother, dad was in life just lived separately, he drank a lot mom not a drinker at all, normal. [employer], danced from age of 7 until out of college. Brother and sister, brother drinks more than sister does. No abuse in family. Grew up in [place], [place].     Do you have any children or Stepchildren? No    Are you being investigated by Child Protection Services? No    Do you have a child protection worker, probation office or ?  No    How  would you describe your current finances?  Doing well    If you are having problems, (unpaid bills, bankruptcy, IRS problems) please explain:  Yes, explain: student loans but who doesn't    If working or a student are you able to function appropriately in that setting? Yes     Describe your preferred learning style:  by reading and by hands-on practice    What are your some of your personal strengths?  family juan jose    Do you currently participate in community juan jose activities, such as attending Religious, temple, Pentecostal or Denominational services?  Yes, explain: Worship    How does your spirituality impact your recovery?  Pt reported she is not in recovery and not sure about complete abstinence yet.    Do you currently self-administer your medications?  Yes    Have you ever had to lie to people important to you about how much you song?   No   Have you ever felt the need to bet more and more money?   No   Have you ever attempted treatment for a gambling problem?   No   Have you ever touched or fondled someone else inappropriately or forced them to have sex with you against their will?   No   Are you or have you ever been a registered sex offender?   No   Is there any history of sexual abuse in your family? No   Have you ever felt obsessed by your sexual behavior, such as having sex with many partners, masturbating often, using pornography often?   No     Have you ever received therapy or stayed in the hospital for mental health problems?   Yes, explain: Pt reported past therapy.     Have you ever hurt yourself, such as cutting, burning or hitting yourself?   No     Have you ever purged, binged or restricted yourself as a way to control your weight?   No     Are you on a special diet?   No     Do you have any concerns regarding your nutritional status?   No     Have you had any appetite changes in the last 3 months?   Yes, explain: don't really eat during the day, might have one a day.   Have you had weight loss or weight  gain of more than 10 lbs in the last 3 months?   If patient gained or lost more than 10 lbs, then refer to program RN / attending Physician for assessment.   Yes, explain: loss some weight   Was the patient informed of BMI?    Above,  General nutrition education   No   Have you engaged in any risk-taking behavior that would put you at risk for exposure to blood-borne or sexually transmitted diseases?   No   Do you have any dental problems?   No   Have you ever lived through any trauma or stressful life events?   No   In the past month, have you had any of the following symptoms related to the trauma listed above? (dreams, intense memories, flashbacks, physical reactions, etc.)   No   Have you ever believed people were spying on you, or that someone was plotting against you or trying to hurt you?   No   Have you ever believed someone was reading your mind or could hear your thoughts or that you could actually read someone's mind or hear what another person was thinking?   No   Have you ever believed that someone of some force outside of yourself was putting thoughts into your mind or made you act in a way that was not your usual self?  Have you ever though you were possessed?   No   Have you ever believed you were being sent special messages through the TV, radio or newspaper?   No   Have you ever heard things other people couldn't hear, such as voices or other noises?   No   Have you ever had visions when you were awake?  Or have you ever seen things other people couldn't see?   No   Do you have a valid 's license?    Yes     PHQ-9, HAYDER-7 and Suicide Risk Assessment   PHQ-9 on 9/9/2019 HAYDER-7 on 9/9/2019   The patient's PHQ-9 score was 10 out of 27, indicating moderate depression.   The patient's HAYDER-7 score was 3 out of 21, indicating minimal anxiety.       [place]-Suicide Severity Rating Scale   Suicide Ideation   1.) Have you ever wished you were dead or that you could go to sleep and not wake up?      Lifetime:  No   Past Month:  No     2.) Have you actually had any thoughts of killing yourself?   Lifetime:  No   Past Month:  No     3.) Have you been thinking about how you might do this?     Lifetime:  No   Past Month:  No     4.) Have you had these thoughts and had some intention of acting on them?     Lifetime:  No   Past Month:  No     5.) Have you started to work out the details of how to kill yourself?   Lifetime:  No   Past Month:  No     6.) Do you intend to carry out this plan?      Lifetime:  No   Past Month:  No     Intensity of Ideation   Intensity of ideation (1 being least severe, 5 being most severe):     Lifetime:  The patient denied ever having any suicidal thoughts in life.   Past Month:  The patient denied ever having any suicidal thoughts in life.     How often do you have these thoughts?  The patient denied ever having any suicidal thoughts in life.     When you have the thoughts how long do they last?  The patient denied ever having any suicidal thoughts in life.     Can you stop thinking about killing yourself or wanting to die if you want to?  The patient denied ever having any suicidal thoughts in life.     Are there things - anyone or anything (i.e. family, Yarsani, pain of death) that stopped you from wanting to die or acting on thoughts of suicide?  Does not apply     What sort of reasons did you have for thinking about wanting to die or killing yourself (ie end pain, stop how you were feeling, get attention or reaction, revenge)?  Does not apply     Suicidal Behavior   (Suicide Attempt) - Have you made a suicide attempt?     Lifetime:  The patient had never made a suicide attempt.   Past Month:  The patient had never made a suicide attempt.     Have you engaged in self-harm (non-suicidal self-injury)?  The patient denied having any history of engaging in self-harm (non-suicidal self-injury).     (Interrupted Attempt) - Has there been a time when you started to do something to end  "your life but someone or something stopped you before you actually did anything?  No     (Aborted or Self-Interrupted Attempt) - Has there been a time when you started to do something to try to end your life but you stopped yourself before you actually did anything?  No     (Preparatory Acts of Behavior) - Have you taken any steps towards making suicide attempt or preparing to kill yourself (such as collecting pills, getting a gun, giving valuables away or writing a suicide note)?  No     Actual Lethality/Medical Damage:  The patient denied ever making a suicidal attempt.       2008  The Research [facility]  Used with permission by Sun Tee, PhD.       Guide to C-SSRS Risk Ratings   NO IDEATION:  with no active thoughts IDEATION: with a wish to die. IDEATION: with active thoughts. Risk Ratings   If Yes No No 0 - Very Low Risk   If NA Yes No 1 - Low Risk   If NA Yes Yes 2 - Low/moderate risk   IDEATION: associated thoughts of methods without intent or plan INTENT: Intent to follow through on suicide PLAN: Plan to follow through on suicide Risk Ratings cont...   If Yes No No 3 - Moderate Risk   If Yes Yes No 4 - High Risk   If Yes Yes Yes 5 - High Risk   The patient's ADDITIONAL RISK FACTORS and lack of PROTECTIVE FACTORS may increase their overall suicide risk ratings.     Additional Risk Factors:    No additional risk factors   Protective Factors:    Having people in his/her life that would prevent the patient from considering a suicide attempt (i.e. young children, spouse, parents, etc.)     An absence of chronic health problems or stable and well treated chronic health issues     A positive relationship with his/her clinical medical and/or mental health providers     Risk Status   Past month:0. - Very Low Risk:  Evaluation Counselors:  Document in Epic / SBAR to counselor \"Very Low Risk\".      Treatment Counselors:  Reassess upon admission as applicable, assess weekly in progress notes " "under Dimension 3 and summarize in Discharge / Treatment summary under Dimension 3.    Past 24 hours:0. - Very Low Risk:  Evaluation Counselors:  Document in Epic / SBAR to counselor \"Very Low Risk\".      Treatment Counselors:  Reassess upon admission as applicable, assess weekly in progress notes under Dimension 3 and summarize in Discharge / Treatment summary under Dimension 3.   Additional information to support suicide risk rating: There was no additional information to provide at this time.     Mental Health Status   Physical Appearance/Attire: Appears stated age and Neat   Hygiene: well groomed   Eye Contact: at examiner   Speech Rate:  regular   Speech Volume: regular   Speech Quality: fluid   Cognitive/Perceptual:  reality based   Cognition: memory intact    Judgment: able to concentrate   Insight: able to concentrate   Orientation:  time, place, person and situation   Thought: concrete   Hallucinations:  none   General Behavioral Tone: cooperative   Psychomotor Activity: no problem noted   Gait:  no problem   Mood: appropriate   Affect: congruence/appropriate   Counselor Notes: NA     Criteria for Diagnosis: DSM-5 Criteria for Substance Use Disorders      Alcohol Use Disorder Moderate - 303.90 (F10.20)    Level of Care   I.) Intoxication and Withdrawal: 0   II.) Biomedical:  1   III.) Emotional and Behavioral:  0   IV.) Readiness to Change:  1   V.) Relapse Potential: 3   VI.) Recovery Environmental: 2     Initial Problem List     The patient is currently living in an unhealthy and/or using environment  The patient lacks relapse prevention skills  The patient has poor coping skills  The patient has poor refusal skills   The patient lacks a sober peer support network    Patient/Client is willing to follow treatment recommendations.  Yes    Counselor: [facility]        Vulnerable Adult Checklist for OUTPATIENTS     1.  Do you have a physical, emotional or mental infirmity or dysfunction?       " No    2.  Does this issue impair your ability to provide for your own care without help, including providing yourself with food, shelter, clothing, healthcare or supervision?       No    3.  Because of this issue, I need assistance to protect myself from maltreatment by others.      No    Based on the above information:    This person is not a functional Vulnerable Adult according to [place] Statute 626.5572 subdivision 21.

## 2019-09-09 NOTE — PROGRESS NOTES
Rule 25 Assessment  Background Information   1. Date of Assessment Request  2. Date of Assessment  9/9/2019 3. Date Service Authorized     4.   REA Higgins   5.  Phone Number   [phone] 6. Referent  Self 7. Assessment Site  [facility]     8. Client Name   Srinath Stewart 9. Date of Birth  1985 Age  34 year old 10. Gender  female  11. PMI/ Insurance No.  [id number]   12. Client's Primary Language:  English 13. Do you require special accommodations, such as an  or assistance with written material? No   14. Current Address: [address] [place] [address] [address]   [facility] [id number]   15. Client Phone Numbers: [phone] (home)      16. Tell me what has happened to bring you here today.    Per EHR intake:  Pt calls to schedule CD Eval in [place] location.       Per patient: Just kind of feeling like I am maybe somewhat substance abuse with alcohol. Sometimes it feels like it is becoming more than it used to be and withdrawal is.        17. Have you had other rule 25 assessments?     No    DIMENSION I - Acute Intoxication /Withdrawal Potential   1. Chemical use most recent 12 months outside a facility and other significant use history (client self-report)              X = Primary Drug Used   Age of First Use Most Recent Pattern of Use and Duration   Need enough information to show pattern (both frequency and amounts) and to show tolerance for each chemical that has a diagnosis   Date of last use and time, if needed   Withdrawal Potential? Requiring special care Method of use  (oral, smoked, snort, IV, etc)     X Alcohol     17 Per patient: every day, yesterday was less, paying attention, times where it was a lot. 4 drinks paying attention, on average four drinks. In the last year heaviest amount that I could drink:I would have to say between a large bottle of alcohol 75 ounces could probably do half of it. Probably have to say every other weekend consume  "more, or once in a while.     Per EHR progress note on 8/19/19:  Cut back on alcohol some, is eating a little more. Sometimes gets shaky. Has a few drinks nightly, on weekends more. Sometimes doesn't eat at all during the day stating \" water or coffee fills me up\". We talked about malnutrition, especially with her drinking excessive amounts of alcohol. Encouraged her to try eating 1 meal a day plus snacks. I think having her try and do 3 meals a day is too much for her at this point.     Per EHR office note on 3/18/19:  Last summer had 'episode' where muscles were 'seizing up and twitching'. Went to ER. She got fluids and potassium. Noted to be likely panic attack at that time. She talked to her PCP then, her liver was 'fatty'. She notes sometimes after heavy drinking she feels like this. She ran out of those, that occurred again on 1/31/19. Her insurance changed so can't see her old PCP. Last Saturday felt shaky, dizzy. Was heavily drinking Friday. Somedays she feels she has to drink alcohol to feel better. During the week will have 2 drinks (or more?) in the evening. Drinks daily-7 days a week. Binge drinking on weekends with . Usually eats dinner with  but not always. Sometimes snacks during the day or has only 1 meal. Not much of an appetite. Drinks [employer] 1.5 L vodka over the weekedn between her  and her.  She denies missing work due to her alcohol intake.     9/8/19, couple glasses of wine, and two drinks, probably 10:30pm no oral      Marijuana/  Hashish   23 Per patient:edibles, 2-3 times in last year. Went to [place].      April of 2019 no oral      Cocaine/Crack     No use          Meth/  Amphetamines   No use          Heroin     No use          Other Opiates/  Synthetics   No use          Inhalants     No use          Benzodiazepines     No use          Hallucinogens     No use          Barbiturates/  Sedatives/  Hypnotics No use          Over-the-Counter Drugs   No use          " Other     No use          Nicotine     18   Per patient:daily vaping. Every couple days have to refill.     Per EHR progress note on 19:  Vape: throughout the day. States she smokes less now with vaping than with cigarettes. Advised that it is still bad for her health but we will focus on increasing food intake and decreasing alcohol intake at this time.          Per EHR office note on 3/18/19:  No cigarettes but vapes.    19, I do not know, 20-30 puffs, afternoon no smoke     2. Do you use greater amounts of alcohol/other drugs to feel intoxicated or achieve the desired effect?  Yes.  Or use the same amount and get less of an effect?  Yes.  Example: The patient reported having increased use and tolerance issues with alcohol.    3A. Have you ever been to detox?     No    3B. When was the first time?     The patient denied ever having a detoxification admission.    3C. How many times since then?     The patient denied ever having a detoxification admission.    3D. Date of most recent detox:     The patient denied ever having a detoxification admission.    4.  Withdrawal symptoms: Have you had any of the following withdrawal symptoms?  Past 12 months Recent (past 30 days)   Sweating (Rapid Pulse)  Shaky / Jittery / Tremors  Unable to Sleep  Headache  Fatigue / Extremely Tired  Sad / Depressed Feeling  Muscle Aches  Vivid / Unpleasant Dreams  Irritability  High Blood Pressure  Nausea / Vomiting  Dizziness  Diminished Appetite  Unable to Eat  Anxiety / Worried None     's Visual Observations and Symptoms: No visible withdrawal symptoms at this time    Based on the above information, is withdrawal likely to require attention as part of treatment participation?  No    Dimension I Ratings   Acute intoxication/Withdrawal potential - The placing authority must use the criteria in Dimension I to determine a client s acute intoxication and withdrawal potential.    RISK DESCRIPTIONS - Severity ratin Client  displays full functioning with good ability to tolerate and cope with withdrawal discomfort. No signs or symptoms of intoxication or withdrawal or resolving signs or symptoms.    REASONS SEVERITY WAS ASSIGNED (What about the amount of the person s use and date of most recent use and history of withdrawal problems suggests the potential of withdrawal symptoms requiring professional assistance? )     No current concerns.         DIMENSION II - Biomedical Complications and Conditions   1a. Do you have any current health/medical conditions?(Include any infectious diseases, allergies, or chronic or acute pain, history of chronic conditions)       Yes.   Illnesses/Medical Conditions you are receiving care for: see below.    Per EHR medical history through [facility]:     Problem list and histories reviewed & adjusted, as indicated.  Additional history: as documented         Patient Active Problem List   Diagnosis     Dizziness     Essential hypertension     Alcohol abuse     Mixed hyperlipidemia     Dermatitis     Sleep apnea, unspecified type     Smoking addiction     Anxiety     Elevated liver enzymes     Past Surgical History   Past Surgical History:   Procedure Laterality Date     HERNIA REPAIR   2005          Social History           Tobacco Use     Smoking status: Never Smoker     Smokeless tobacco: Current User   Substance Use Topics     Alcohol use: Yes     Family History         Family History   Problem Relation Age of Onset     Hypertension Father       Other - See Comments Father           brain anyurism dx 2018     Alcoholism Father           hasn't drank since 2018     Depression Maternal Grandmother       Asthma Nephew       Eczema Nephew               Current Outpatient Prescriptions          Current Outpatient Medications   Medication Sig Dispense Refill     amLODIPine (NORVASC) 5 MG tablet Take 5 mg by mouth         hydrochlorothiazide (HYDRODIURIL) 25 MG tablet Take 25 mg by mouth         norethindrone  "(SELVIN) 0.35 MG tablet           ondansetron (ZOFRAN-ODT) 4 MG ODT tab DISSOLVE 1 TABLET ON TONGUE EVERY 8 HOURS AS NEEDED   0     triamcinolone (KENALOG) 0.025 % external ointment Apply topically 2 times daily 80 g 1               Allergies   Allergen Reactions     Ace Inhibitors Swelling       Lisinopril           1b. On a scale of mild, moderate to severe please specify the severity of the patient's diabetes and/or neuropathy.    The patient denied having a history of being diagnosed with diabetes or neuropathy.    2. Do you have a health care provider? When was your most recent appointment? What concerns were identified?     The patient's PCP is April Magaña., per EHR last office note through [facility] was on 8/19/19 for physical.     3. If indicated by answers to items 1 or 2: How do you deal with these concerns? Is that working for you? If you are not receiving care for this problem, why not?      Pt reported prescribed medications.     4A. List current medication(s) including over-the-counter or herbal supplements--including pain management:     Per EHR prescribed medication through [facility]:  Current Outpatient Medications   Medication     amLODIPine (NORVASC) 5 MG tablet     hydrochlorothiazide (HYDRODIURIL) 25 MG tablet     norethindrone (SELVIN) 0.35 MG tablet     triamcinolone (KENALOG) 0.025 % external ointment     No current facility-administered medications for this encounter.        4B. Do you follow current medical recommendations/take medications as prescribed?     Yes    4C. When did you last take your medication?     Amlodipine, usually take it every day but took it yesterday.    4D. Do you need a referral to have a follow up with a primary care physician?    No.    5. Has a health care provider/healer ever recommended that you reduce or quit alcohol/drug use?     Yes    per EHR progress note on 8/19/19 from provider:  \"4. Elevated liver enzymes  Send Mychart message about enzymes still " "being high. She needs to decrease alcohol intake to improve the numbers, is at risk for alcohol induced liver disease if she doesn't make changes or if the levels continue to elevate. recommended to return in 3 months for follow up in person and recheck liver enzymes.   5. Alcohol abuse  States she cut back on alcohol but still feels \"shaky\" at times. Has a few drinks a night, more on the weekends. Sounds like her  also drinks excessively with her. She doesn't seem motivated to cut back at this time but will continue to encourage her to do so\".    6. Are you pregnant?     No    7. Have you had any injuries, assaults/violence towards you, accidents, health related issues, overdose(s) or hospitalizations related to your use of alcohol or other drugs:     No    8. Do you have any specific physical needs/accommodations? No    Dimension II Ratings   Biomedical Conditions and Complications - The placing authority must use the criteria in Dimension II to determine a client s biomedical conditions and complications.   RISK DESCRIPTIONS - Severity ratin Client tolerates and mackenzie with physical discomfort and is able to get the services that the client needs.    REASONS SEVERITY WAS ASSIGNED (What physical/medical problems does this person have that would inhibit his or her ability to participate in treatment? What issues does he or she have that require assistance to address?)    Pt reported medical conditions. Pt reported she has a pcp and is able to get the services she needs. Pt reported she takes her medications as prescribed and directed by her provider for medical conditions.         DIMENSION III - Emotional, Behavioral, Cognitive Conditions and Complications   1. (Optional) Tell me what it was like growing up in your family. (substance use, mental health, discipline, abuse, support)     Per patient: raised by parents mother and father living, one sister and one brother living, father-alcohol, pretty chill, "  when I was about 14. Raised by mother, dad was in life just lived separately, he drank a lot mom not a drinker at all, normal. [employer], danced from age of 7 until out of college. Brother and sister, brother drinks more than sister does. No abuse in family. Grew up in [place], [place].     2. When was the last time that you had significant problems...  A. with feeling very trapped, lonely, sad, blue, depressed or hopeless  about the future? 2 - 12 months ago-per patient:work-work life sucks, been taken over by another team, they do not understand our job and makes it hard. Me wanting to transition into what I went to school for, right now not feasible for my  and I and looking to do a move out of state so kind of trying to grind right now.     B. with sleep trouble, such as bad dreams, sleeping restlessly, or falling  asleep during the day? Past Month-pt reported same reasons as above.    C. with feeling very anxious, nervous, tense, scared, panicked, or like  something bad was going to happen? Never    D. with becoming very distressed and upset when something reminded  you of the past? Never    E. with thinking about ending your life or committing suicide? Never    3. When was the last time that you did the following things two or more times?  A. Lied or conned to get things you wanted or to avoid having to do  something? Never    B. Had a hard time paying attention at school, work, or home? 2 - 12 months ago-pt reported same reasons as above.    C. Had a hard time listening to instructions at school, work, or home? Never    D. Were a bully or threatened other people? Never    E. Started physical fights with other people? Never    Note: These questions are from the Global Appraisal of Individual Needs--Short Screener. Any item marked  past month  or  2 to 12 months ago  will be scored with a severity rating of at least 2.     For each item that has occurred in the past month or past  year ask follow up questions to determine how often the person has felt this way or has the behavior occurred? How recently? How has it affected their daily living? And, whether they were using or in withdrawal at the time?    See above    4A. If the person has answered item 2E with  in the past year  or  the past month , ask about frequency and history of suicide in the family or someone close and whether they were under the influence.     Pt denied past or current SI.    Any history of suicide in your family? Or someone close to you?     The patient denied any family member or someone close to the patient had ever completed suicide.    4B. If the person answered item 2E  in the past month  ask about  intent, plan, means and access and any other follow-up information  to determine imminent risk. Document any actions taken to intervene  on any identified imminent risk.      The patient denied having any suicide ideation within the past month.    5A. Have you ever been diagnosed with a mental health problem?     No      5B. Are you receiving care for any mental health issues? If yes, what is the focus of that care or treatment?  Are you satisfied with the service? Most recent appointment?  How has it been helpful?     The patient denied having any current or past mental health issues that had required treatment.  Per patient:When parents  we went through some classes and got to sit down and talk to people that is it. Marriage counseling, part of marriage process.     6. Have you been prescribed medications for emotional/psychological problems?     The patient denied having any history of being prescribed psychotropic medications for mental health issues.    7. Does your MH provider know about your use?     The patient does not currently have any mental health providers.    8A. Have you ever been verbally, emotionally, physically or sexually abused?      No     Follow up questions to learn current risk,  continuing emotional impact.      The patient denied having any history of being verbally, emotionally, physically or sexually abused.    8B. Have you received counseling for abuse?      No    9. Have you ever experienced or been part of a group that experienced community violence, historical trauma, rape or assault?     No    10A. :    No    11. Do you have problems with any of the following things in your daily life?    No      Note: If the person has any of the above problems, follow up with items 12, 13, and 14. If none of the issues in item 11 are a problem for the person, skip to item 15.    The patient denied having any history of having problems with headaches, dizziness, problem solving, concentration, performing job/school work, remembering, in relationships with others, reading, writing, calculation, fights, being fired or arrests in her daily life.    12. Have you been diagnosed with traumatic brain injury or Alzheimer s?  No    13. If the answer to #12 is no, ask the following questions:    Have you ever hit your head or been hit on the head? No    Were you ever seen in the Emergency Room, hospital or by a doctor because of an injury to your head? No    Have you had any significant illness that affected your brain (brain tumor, meningitis, West Nile Virus, stroke or seizure, heart attack, near drowning or near suffocation)? No    14. If the answer to #12 is yes, ask if any of the problems identified in #11 occurred since the head injury or loss of oxygen. No    15A. Highest grade of school completed:     Associate degree/vocational certificate    15B. Do you have a learning disability? No    15C. Did you ever have tutoring in Math or English? No    15D. Have you ever been diagnosed with Fetal Alcohol Effects or Fetal Alcohol Syndrome? No    16. If yes to item 15 B, C, or D: How has this affected your use or been affected by your use?     No    Dimension III Ratings    Emotional/Behavioral/Cognitive - The placing authority must use the criteria in Dimension III to determine a client s emotional, behavioral, and cognitive conditions and complications.   RISK DESCRIPTIONS - Severity ratin Client has good impulse control and coping skills and presents no risk of harm to self or others. Client functions in all life areas and displays no emotional, behavioral or cognitive problems or the problems are stable..    REASONS SEVERITY WAS ASSIGNED - What current issues might with thinking, feelings or behavior pose barriers to participation in a treatment program? What coping skills or other assets does the person have to offset those issues? Are these problems that can be initially accommodated by a treatment provider? If not, what specialized skills or attributes must a provider have?    Pt denied hx of formal mental health diagnosis. Pt denied past or current prescribed medications for mental health symptoms. Pt reported past but denied current therapy. Pt denied past or current abuse of any type. Pt denied past or current SI. Pt denied past suicide attempts.         DIMENSION IV - Readiness for Change   1. You ve told me what brought you here today. (first section) What do you think the problem really is?     Pt reported: Just kind of feeling like I am maybe somewhat substance abuse with alcohol. Sometimes it feels like it is becoming more than it used to be and withdrawal is.     2. Tell me how things are going. Ask enough questions to determine whether the person has use related problems or assets that can be built upon in the following areas: Family/friends/relationships; Legal; Financial; Emotional; Educational; Recreational/ leisure; Vocational/employment; Living arrangements (DSM)      Relationship-hit and miss. Great relationship with mother and dad, and brother and sister same thing.   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 Making own products skin products.  Financial-Doing well, student loans but who doesn't.   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   Living arrangements- and I    3. What activities have you engaged in when using alcohol/other drugs that could be hazardous to you or others (i.e. driving a car/motorcycle/boat, operating machinery, unsafe sex, sharing needles for drugs or tattoos, etc     The patient denied engaging in any of the above dangerous activities when using alcohol and/or drugs. Usually at home. Usually Uber if we go out.     4. How much time do you spend getting, using or getting over using alcohol or drugs? (DSM)     Pt reported: every day, yesterday was less, paying attention, times where it was a lot. 4 drinks paying attention, on average four drinks. I would have to say between a large bottle of alcohol 75 ounces could probably do half of it. Probably have to say every other weekend consumer more, or once in a while.       5. Reasons for drinking/drug use (Use the space below to record answers. It may not be necessary to ask each item.)  Like the feeling Yes   Trying to forget problems Yes   To cope with stress Yes   To relieve physical pain No   To cope with anxiety Yes   To cope with depression No   To relax or unwind Yes   Makes it easier to talk with people No   Partner encourages use Yes   Most friends drink or use Yes   To cope with family problems No   Afraid of withdrawal symptoms/to feel better Yes   Other (specify)  No     A. What concerns other people about your alcohol or drug use/Has anyone told you that you use too much? What did they say? (DSM)     Per patient: my , we both are like, we should probably stop drinking as much as we do, health, have high blood pressure, cholesterol has been kind of high, test results, that there is fatty tissue on liver. No one else says  anything, I know it is too much, to feel like that the next day, looking to have a drink, get back to feeling a certain way.     B. What did you think about that/ do you think you have a problem with alcohol or drug use?     Pt reported she does not want to be dependent on alcohol.     6. What changes are you willing to make? What substance are you willing to stop using? How are you going to do that? Have you tried that before? What interfered with your success with that goal?      Per patient: Main thing is to get in under control, not to need to do it, do not feel I do not need to drink at all, I need to get back to place, of controlling how much I am drinking and healthier me. Willing to abstain for a period of time.    7. What would be helpful to you in making this change?     Per patient: not exactly sure.     Dimension IV Ratings   Readiness for Change - The placing authority must use the criteria in Dimension IV to determine a client s readiness for change.   RISK DESCRIPTIONS - Severity ratin Client is motivated with active reinforcement, to explore treatment and strategies for change, but ambivalent about illness or need for change.    REASONS SEVERITY WAS ASSIGNED - (What information did the person provide that supports your assessment of his or her readiness to change? How aware is the person of problems caused by continued use? How willing is she or he to make changes? What does the person feel would be helpful? What has the person been able to do without help?)      Pt reported she is drinking more than she used to. Pt reported her  has expressed concern about their use. Pt reported she would like to get her drinking under control and is willing to abstain from use. Pt appears to lack some insight into the severity of her use.          DIMENSION V - Relapse, Continued Use, and Continued Problem Potential   1A. In what ways have you tried to control, cut-down or quit your use? If you have had  periods of sobriety, how did you accomplish that? What was helpful? What happened to prevent you from continuing your sobriety? (DSM)     Per patient: lately, what I have been doing, making sure that I am not consuming more than one drink in an hour, done that for the last few months. Still have days, kind of jittery and wanting to have a drink. Triggers-mostly out of boredom, save money because we want to move, movies and doing that on weekend, having a drink and eating some food. Out of boredom and maintain the way I feel if I had not had a drink.     1B. What were the circumstances of your most recent relapse with mood altering chemicals?    Per patient: celebrating.     2. Have you experienced cravings? If yes, ask follow up questions to determine if the person recognizes triggers and if the person has had any success in dealing with them.     Per patient:Stressful day at work cannot wait to go home and have a glass of wine at work. Not physical.     3. Have you been treated for alcohol/other drug abuse/dependence? No    4. Support group participation: Have you/do you attend support group meetings to reduce/stop your alcohol/drug use? How recently? What was your experience? Are you willing to restart? If the person has not participated, is he or she willing?     Per patient:no past or current sober support group meetings.    5. What would assist you in staying sober/straight?     Pt reported she is unsure.    Dimension V Ratings   Relapse/Continued Use/Continued problem potential - The placing authority must use the criteria in Dimension V to determine a client s relapse, continued use, and continued problem potential.   RISK DESCRIPTIONS - Severity rating: 3 Client has poor recognition and understanding of relapse and recidivism issues and displays moderately high vulnerability for further substance use or mental health problems. Client has few coping skills and rarely applies coping skills.    REASONS SEVERITY  WAS ASSIGNED - (What information did the person provide that indicates his or her understanding of relapse issues? What about the person s experience indicates how prone he or she is to relapse? What coping skills does the person have that decrease relapse potential?)      Pt reported she is aware her continued use has a negative impact on her physical health. Pt reported drinking daily. Pt reported triggers. Pt denied past treatment attendance or sober support group meeting attendance. Pt would appear to benefit from education about LINDSAY/MH and learning positive coping skills and relapse skills.          DIMENSION VI - Recovery Environment   1. Are you employed/attending school? Tell me about that.     Pt reported full time for [employer], [employer].    Per EHR progress note on 8/19/19:  Works at [employer] . Looking to maybe move out of [facility]. Is certified esthesition.  Maybe move to [place] next year.    Per EHR progress note on 3/18/19:  Works for [employer] is . Is busy season.  States work is going with her to get more busy soon.    2A. Describe a typical day; evening for you. Work, school, social, leisure, volunteer, spiritual practices. Include time spent obtaining, using, recovering from drugs or alcohol. (DSM)     Per patient: M-F 9-5:30 lot of times there later than 5:30pm.     Please describe what leisure activities have been associated with your substance abuse:     Pt reported cooking    2B. How often do you spend more time than you planned using or use more than you planned? (DSM)     Pt reported:Probably have to say every other weekend consume more, or once in a while.     3. How important is using to your social connections? Do many of your family or friends use?     Pt reported most social connections use.     4A. Are you currently in a significant relationship?     Yes.  4B. How long? Been together 15 years  for 5 9/19/19.            Please describe your  "significant other's use of mood altering chemicals? He has more control than I do, he could go a day without drinking, I mostly, during the week, couple glasses of wine, one with dinner, one after dinner. He can say he isn't going to have any tonight.    4C. Sexual Orientation:     Heterosexual    5A. Who do you live with?          5B. Tell me about their alcohol/drug use and mental health issues.     See above    5C. Are you concerned for your safety there? No    5D. Are you concerned about the safety of anyone else who lives with you? No    6A. Do you have children who live with you?     No    6B. Do you have children who do not live with you?     No    7A. Who supports you in making changes in your alcohol or drug use? What are they willing to do to support you? Who is upset or angry about you making changes in your alcohol or drug use? How big a problem is this for you?      \" and mother\"    7B. This table is provided to record information about the person s relationships and available support It is not necessary to ask each item; only to get a comprehensive picture of their support system.  How often can you count on the following people when you need someone?   Partner / Spouse Always supportive   Parent(s)/Aunt(s)/Uncle(s)/Grandparents Always supportive   Sibling(s)/Cousin(s) Usually supportive   Child(blake) The patient doesn't have any children.   Other relative(s) The patient doesn't have any current contact with other relatives.   Friend(s)/neighbor(s) Usually supportive   Child(blake) s father(s)/mother(s) The patient doesn't have any children.   Support group member(s) The patient denied having any current involvement with 12-step or other support group meetings.   Community of [employer] members Usually supportive   /counselor/therapist/healer The patient denied having any current involvement with a , counselor, therapist or healer.   Other (specify) No     8A. What is " your current living situation?     Pt reported living in apartment with     8B. What is your long term plan for where you will be living?     Pt reported moving out of state.     8C. Tell me about your living environment/neighborhood? Ask enough follow up questions to determine safety, criminal activity, availability of alcohol and drugs, supportive or antagonistic to the person making changes.      Per patient:safe    9. Criminal justice history: Gather current/recent history and any significant history related to substance use--Arrests? Convictions? Circumstances? Alcohol or drug involvement? Sentences? Still on probation or parole? Expectations of the court? Current court order? Any sex offenses - lifetime? What level? (DSM)    None    10. What obstacles exist to participating in treatment? (Time off work, childcare, funding, transportation, pending intermediate time, living situation)     Pt reported working full time.    Dimension VI Ratings   Recovery environment - The placing authority must use the criteria in Dimension VI to determine a client s recovery environment.   RISK DESCRIPTIONS - Severity ratin Client is engaged in structured, meaningful activity, but peers, family, significant other, and living environment are unsupportive, or there is criminal justice involvement by the client or among the client's peers, significant others, or in the client's living environment.    REASONS SEVERITY WAS ASSIGNED - (What support does the person have for making changes? What structure/stability does the person have in his or her daily life that will increase the likelihood that changes can be sustained? What problems exist in the person s environment that will jeopardize getting/staying clean and sober?)     Pt reported working full time. Pt reported she lives with her  who consumes alcohol similar to her. Pt reported social connections use. Pt reported her support is her  and mother. Pt denied  having children at this time. Pt denied past or current legal issues. Pt would appear to benefit from gaining sober support network.         Client Choice/Exceptions   Would you like services specific to language, age, gender, culture, Yarsani preference, race, ethnicity, sexual orientation or disability?  No    What particular treatment choices and options would you like to have? None    Do you have a preference for a particular treatment program? None    Criteria for Diagnosis     Criteria for Diagnosis  DSM-5 Criteria for Substance Use Disorder  Instructions: Determine whether the client currently meets the criteria for Substance Use Disorder using the diagnostic criteria in the DSM-V pp.[phone]. Current means during the most recent 12 months outside a facility that controls access to substances    Category of Substance Severity (ICD-10 Code / DSM 5 Code)     Alcohol Use Disorder Moderate  (F10.20) (303.90)   Cannabis Use Disorder The patient does not meet the criteria for a Cannabis use disorder.   Hallucinogen Use Disorder The patient does not meet the criteria for a Hallucinogen use disorder.   Inhalant Use Disorder The patient does not meet the criteria for an Inhalant use disorder.   Opioid Use Disorder The patient does not meet the criteria for an Opioid use disorder.   Sedative, Hypnotic, or Anxiolytic Use Disorder The patient does not meet the criteria for a Sedative/Hypnotic use disorder.   Stimulant Related Disorder The patient does not meet the criteria for a Stimulant use disorder.   Tobacco Use Disorder The patient does not meet the criteria for a Tobacco use disorder.   Other (or unknown) Substance Use Disorder The patient does not meet the criteria for a Other (or unknown) Substance use disorder.       Collateral Contact Summary   Number of contacts made: 1    Contact with referring person:  Yes    If court related records were reviewed, summarize here: No court records had been reviewed at the  time of this documentation.    Information from collateral contacts supported/largely agreed with information from the client and associated risk ratings.      Rule 25 Assessment Summary and Plan   's Recommendation    1. Abstain from using all non-prescribed mood altering chemicals and substances. Take all medication as prescribed and directed by licensed physicians.  2. Attend and complete an IOP treatment program such as [facility].  3. Continue to follow all recommendations and referral made by other providers such as pcp.      Collateral Contacts     Name:    Electronic Health Records (EHR)   Relationship:    Health Records   Phone Number:    None Releases:    Yes     Pt medical record was reviewed and utilized for collateral purposes of this assessment and largely agreed with the information from the patient.      Collateral Contacts     Name:    None   Relationship:    None   Phone Number:    None   Releases:    None     None    ollateral Contacts      A problematic pattern of alcohol/drug use leading to clinically significant impairment or distress, as manifested by at least two of the following, occurring within a 12-month period:    2.) There is a persistent desire or unsuccessful efforts to cut down or control alcohol/drug use  3.) A great deal of time is spent in activities necessary to obtain alcohol, use alcohol, or recover from its effects.  9.) Alcohol/drug use is continued despite knowledge of having a persistent or recurrent physical or psychological problem that is likely to have been caused or exacerbated by alcohol.  10.) Tolerance, as defined by either of the following: A need for markedly increased amounts of alcohol/drug to achieve intoxication or desired effect. and A markedly diminished effect with continued use of the same amount of alcohol/drug.  11.) Withdrawal, as manifested by either of the following: The characteristic withdrawal syndrome for alcohol/drug  (refer to Criteria A and B of the criteria set for alcohol/drug withdrawal). and Alcohol/drug (or a closely related substance, such as a benzodiazepine) is taken to relieve or avoid withdrawal symptoms.      Specify if: In early remission:  After full criteria for alcohol/drug use disorder were previously met, none of the criteria for alcohol/drug use disorder have been met for at least 3 months but for less than 12 months (with the exception that Criterion A4,  Craving or a strong desire or urge to use alcohol/drug  may be met).     In sustained remission:   After full criteria for alcohol use disorder were previously met, non of the criteria for alcohol/drug use disorder have been met at any time during a period of 12 months or longer (with the exception that Criterion A4,  Craving or strong desire or urge to use alcohol/drug  may be met).   Specify if:   This additional specifier is used if the individual is in an environment where access to alcohol is restricted.    Mild: Presence of 2-3 symptoms  Moderate: Presence of 4-5 symptoms  Severe: Presence of 6 or more symptoms

## 2019-09-10 VITALS — HEIGHT: 60 IN | WEIGHT: 140 LBS | BODY MASS INDEX: 27.48 KG/M2

## 2019-09-10 ASSESSMENT — ANXIETY QUESTIONNAIRES
5. BEING SO RESTLESS THAT IT IS HARD TO SIT STILL: NOT AT ALL
2. NOT BEING ABLE TO STOP OR CONTROL WORRYING: SEVERAL DAYS
3. WORRYING TOO MUCH ABOUT DIFFERENT THINGS: NOT AT ALL
IF YOU CHECKED OFF ANY PROBLEMS ON THIS QUESTIONNAIRE, HOW DIFFICULT HAVE THESE PROBLEMS MADE IT FOR YOU TO DO YOUR WORK, TAKE CARE OF THINGS AT HOME, OR GET ALONG WITH OTHER PEOPLE: SOMEWHAT DIFFICULT
6. BECOMING EASILY ANNOYED OR IRRITABLE: SEVERAL DAYS
1. FEELING NERVOUS, ANXIOUS, OR ON EDGE: NOT AT ALL
GAD7 TOTAL SCORE: 3
7. FEELING AFRAID AS IF SOMETHING AWFUL MIGHT HAPPEN: NOT AT ALL

## 2019-09-10 ASSESSMENT — PAIN SCALES - GENERAL: PAINLEVEL: NO PAIN (0)

## 2019-09-10 ASSESSMENT — MIFFLIN-ST. JEOR: SCORE: 1266.54

## 2019-09-10 ASSESSMENT — PATIENT HEALTH QUESTIONNAIRE - PHQ9
5. POOR APPETITE OR OVEREATING: SEVERAL DAYS
SUM OF ALL RESPONSES TO PHQ QUESTIONS 1-9: 10

## 2019-09-11 ASSESSMENT — ANXIETY QUESTIONNAIRES: GAD7 TOTAL SCORE: 3

## 2020-01-16 ENCOUNTER — OFFICE VISIT (OUTPATIENT)
Dept: FAMILY MEDICINE | Facility: CLINIC | Age: 35
End: 2020-01-16
Payer: COMMERCIAL

## 2020-01-16 VITALS
DIASTOLIC BLOOD PRESSURE: 86 MMHG | HEART RATE: 80 BPM | RESPIRATION RATE: 16 BRPM | SYSTOLIC BLOOD PRESSURE: 136 MMHG | OXYGEN SATURATION: 99 % | WEIGHT: 141 LBS | TEMPERATURE: 99.3 F | BODY MASS INDEX: 26.62 KG/M2 | HEIGHT: 61 IN

## 2020-01-16 DIAGNOSIS — R30.0 DYSURIA: Primary | ICD-10-CM

## 2020-01-16 DIAGNOSIS — N30.01 ACUTE CYSTITIS WITH HEMATURIA: ICD-10-CM

## 2020-01-16 LAB
ALBUMIN UR-MCNC: ABNORMAL MG/DL
APPEARANCE UR: CLEAR
BACTERIA #/AREA URNS HPF: ABNORMAL /HPF
BILIRUB UR QL STRIP: NEGATIVE
COLOR UR AUTO: YELLOW
GLUCOSE UR STRIP-MCNC: NEGATIVE MG/DL
HGB UR QL STRIP: ABNORMAL
KETONES UR STRIP-MCNC: 15 MG/DL
LEUKOCYTE ESTERASE UR QL STRIP: NEGATIVE
NITRATE UR QL: NEGATIVE
NON-SQ EPI CELLS #/AREA URNS LPF: ABNORMAL /LPF
PH UR STRIP: 7.5 PH (ref 5–7)
RBC #/AREA URNS AUTO: ABNORMAL /HPF
SOURCE: ABNORMAL
SP GR UR STRIP: 1.02 (ref 1–1.03)
UROBILINOGEN UR STRIP-ACNC: 1 EU/DL (ref 0.2–1)
WBC #/AREA URNS AUTO: ABNORMAL /HPF

## 2020-01-16 PROCEDURE — 99213 OFFICE O/P EST LOW 20 MIN: CPT | Performed by: NURSE PRACTITIONER

## 2020-01-16 PROCEDURE — 87086 URINE CULTURE/COLONY COUNT: CPT | Performed by: NURSE PRACTITIONER

## 2020-01-16 PROCEDURE — 81001 URINALYSIS AUTO W/SCOPE: CPT | Performed by: NURSE PRACTITIONER

## 2020-01-16 RX ORDER — SULFAMETHOXAZOLE/TRIMETHOPRIM 800-160 MG
1 TABLET ORAL 2 TIMES DAILY
Qty: 6 TABLET | Refills: 0 | Status: SHIPPED | OUTPATIENT
Start: 2020-01-16 | End: 2020-01-19

## 2020-01-16 ASSESSMENT — PAIN SCALES - GENERAL: PAINLEVEL: NO PAIN (0)

## 2020-01-16 ASSESSMENT — MIFFLIN-ST. JEOR: SCORE: 1286.95

## 2020-01-16 NOTE — PROGRESS NOTES
Subjective     Srinath Stewart is a 34 year old female who presents to clinic today for the following health issues:    HPI   URINARY TRACT SYMPTOMS  Onset: Tuesday    Description:   Painful urination (Dysuria): YES           Frequency: YES  Blood in urine (Hematuria): YES-pink  Delay in urine (Hesitency): no     Intensity: mild    Progression of Symptoms:  same    Accompanying Signs & Symptoms:  Fever/chills: no   Flank pain no   Nausea and vomiting: no   Any vaginal symptoms: none  Abdominal/Pelvic Pain: YES-more achy     History:   History of frequent UTI's: YES  History of kidney stones: no   Sexually Active: YES-no concerns with STDs  Possibility of pregnancy: No    Precipitating factors:   none    Therapies Tried and outcome: Cranberry juice prn (contraindicated in Coumadin patients) and Increase fluid intake    No discharge, feels like might be UTI. Denies STD concerns. Normal BM. Slight pelvic discomfort.     Of note she is due for physical and rechecking her labs and liver enzymes. She will schedule in the next few weeks when she gets a chance.       Patient Active Problem List   Diagnosis     Dizziness     Essential hypertension     Alcohol abuse     Mixed hyperlipidemia     Dermatitis     Sleep apnea, unspecified type     Smoking addiction     Anxiety     Elevated liver enzymes     Past Surgical History:   Procedure Laterality Date     HERNIA REPAIR  2005       Social History     Tobacco Use     Smoking status: Never Smoker     Smokeless tobacco: Never Used   Substance Use Topics     Alcohol use: Yes     Family History   Problem Relation Age of Onset     Hypertension Father      Other - See Comments Father         brain anyurism dx 2018     Alcoholism Father         hasn't drank since 2018     Depression Maternal Grandmother      Asthma Nephew      Eczema Nephew      Unknown/Adopted Mother          Current Outpatient Medications   Medication Sig Dispense Refill     amLODIPine (NORVASC) 5 MG tablet Take 1  "tablet (5 mg) by mouth daily 90 tablet 0     hydrochlorothiazide (HYDRODIURIL) 25 MG tablet Take 1 tablet (25 mg) by mouth daily 90 tablet 2     norethindrone (SELVIN) 0.35 MG tablet Take 1 tablet (0.35 mg) by mouth daily 28 tablet 11     triamcinolone (KENALOG) 0.025 % external ointment Apply topically 2 times daily 80 g 1     Allergies   Allergen Reactions     Ace Inhibitors Swelling     Lisinopril       Reviewed and updated as needed this visit by Provider         Review of Systems   ROS COMP: Constitutional, cardiovascular, pulmonary, gi and gu-as above systems are negative, except as otherwise noted.      Objective    /86 (BP Location: Right arm, Patient Position: Chair, Cuff Size: Adult Large)   Pulse 80   Temp 99.3  F (37.4  C) (Oral)   Resp 16   Ht 1.549 m (5' 1\")   Wt 64 kg (141 lb)   LMP 01/05/2020 (Exact Date)   SpO2 99%   Breastfeeding No   BMI 26.64 kg/m    Body mass index is 26.64 kg/m .  Physical Exam   GENERAL: healthy, alert and no distress  RESP: lungs clear to auscultation - no rales, rhonchi or wheezes  CV: regular rate and rhythm, normal S1 S2, no S3 or S4, no murmur, click or rub  ABDOMEN: soft, slight lower pelvic discomfort, no hepatosplenomegaly, no masses and bowel sounds normal  MS: no gross musculoskeletal defects noted, no edema    Diagnostic Test Results:  Labs reviewed in Epic  Results for orders placed or performed in visit on 01/16/20   *UA reflex to Microscopic and Culture ([place] and [facility], [place] (except [place] and [place])     Status: Abnormal   Result Value Ref Range    Color Urine Yellow     Appearance Urine Clear     Glucose Urine Negative NEG^Negative mg/dL    Bilirubin Urine Negative NEG^Negative    Ketones Urine 15 (A) NEG^Negative mg/dL    Specific Gravity Urine 1.020 1.003 - 1.035    Blood Urine Large (A) NEG^Negative    pH Urine 7.5 (H) 5.0 - 7.0 pH    Protein Albumin Urine Trace (A) NEG^Negative mg/dL    Urobilinogen Urine 1.0 0.2 - 1.0 EU/dL    " Nitrite Urine Negative NEG^Negative    Leukocyte Esterase Urine Negative NEG^Negative    Source Midstream Urine    Urine Microscopic     Status: Abnormal   Result Value Ref Range    WBC Urine 0 - 5 OTO5^0 - 5 /HPF    RBC Urine O - 2 OTO2^O - 2 /HPF    Squamous Epithelial /LPF Urine Few FEW^Few /LPF    Bacteria Urine Few (A) NEG^Negative /HPF   Urine Culture Aerobic Bacterial     Status: None   Result Value Ref Range    Specimen Description Midstream Urine     Culture Micro       10,000 to 50,000 colonies/mL  mixed urogenital kurt  Susceptibility testing not routinely done             Assessment & Plan       ICD-10-CM    1. Dysuria R30.0 *UA reflex to Microscopic and Culture ([place] and [facility] (except [place] and [place])     Urine Microscopic   2. Acute cystitis with hematuria N30.01 sulfamethoxazole-trimethoprim (BACTRIM DS/SEPTRA DS) 800-160 MG tablet     Urine Culture Aerobic Bacterial     Urine culture was negative. Advised to stop antibiotic. Return if not improving       Return in about 2 weeks (around 1/30/2020), or if symptoms worsen or fail to improve.    CASANDRA Méndez, NP-C  [facility]

## 2020-01-17 LAB
BACTERIA SPEC CULT: NORMAL
SPECIMEN SOURCE: NORMAL

## 2020-03-11 ENCOUNTER — HEALTH MAINTENANCE LETTER (OUTPATIENT)
Age: 35
End: 2020-03-11

## 2020-06-01 DIAGNOSIS — I10 ESSENTIAL HYPERTENSION: ICD-10-CM

## 2020-06-01 NOTE — TELEPHONE ENCOUNTER
"Requested Prescriptions   Pending Prescriptions Disp Refills     amLODIPine (NORVASC) 5 MG tablet 30 tablet 0     Sig: Take 1 tablet (5 mg) by mouth daily Patient needs office visit       Calcium Channel Blockers Protocol  Passed - 6/1/2020  1:54 PM        Passed - Blood pressure under 140/90 in past 12 months     BP Readings from Last 3 Encounters:   01/16/20 136/86   08/19/19 128/88   03/18/19 (!) 140/100                 Passed - Recent (12 mo) or future (30 days) visit within the authorizing provider's specialty     Patient has had an office visit with the authorizing provider or a provider within the authorizing providers department within the previous 12 mos or has a future within next 30 days. See \"Patient Info\" tab in inbasket, or \"Choose Columns\" in Meds & Orders section of the refill encounter.              Passed - Medication is active on med list        Passed - Patient is age 18 or older        Passed - No active pregnancy on record        Passed - Normal serum creatinine on file in past 12 months     Recent Labs   Lab Test 08/19/19  1339   CR 0.82       Ok to refill medication if creatinine is low          Passed - No positive pregnancy test in past 12 months           amLODIPine (NORVASC) 5 MG tablet  Last Written Prescription Date:  5/5/2020  Last Fill Quantity: 30,  # refills: 0   Last office visit: 1/16/2020 with prescribing provider:  April Chang   Future Office Visit:            "

## 2020-06-04 RX ORDER — AMLODIPINE BESYLATE 5 MG/1
5 TABLET ORAL DAILY
Qty: 30 TABLET | Refills: 1 | Status: SHIPPED | OUTPATIENT
Start: 2020-06-04 | End: 2020-07-27

## 2020-07-26 ASSESSMENT — ENCOUNTER SYMPTOMS
CHILLS: 1
HEMATOCHEZIA: 0
DYSURIA: 0
WEAKNESS: 0
SORE THROAT: 0
COUGH: 1
MYALGIAS: 1
JOINT SWELLING: 0
HEMATURIA: 0
HEADACHES: 1
NAUSEA: 1
NERVOUS/ANXIOUS: 1
DIZZINESS: 1
PALPITATIONS: 0
ABDOMINAL PAIN: 1
DIARRHEA: 0
FEVER: 0
EYE PAIN: 0
CONSTIPATION: 1
SHORTNESS OF BREATH: 0
HEARTBURN: 0
PARESTHESIAS: 1
ARTHRALGIAS: 1
FREQUENCY: 0
BREAST MASS: 1

## 2020-07-27 ENCOUNTER — OFFICE VISIT (OUTPATIENT)
Dept: FAMILY MEDICINE | Facility: CLINIC | Age: 35
End: 2020-07-27
Payer: COMMERCIAL

## 2020-07-27 VITALS
BODY MASS INDEX: 25.99 KG/M2 | SYSTOLIC BLOOD PRESSURE: 136 MMHG | DIASTOLIC BLOOD PRESSURE: 80 MMHG | HEIGHT: 61 IN | TEMPERATURE: 97.9 F | HEART RATE: 89 BPM | RESPIRATION RATE: 20 BRPM | WEIGHT: 137.65 LBS | OXYGEN SATURATION: 100 %

## 2020-07-27 DIAGNOSIS — Z12.4 SCREENING FOR MALIGNANT NEOPLASM OF CERVIX: ICD-10-CM

## 2020-07-27 DIAGNOSIS — R53.83 OTHER FATIGUE: ICD-10-CM

## 2020-07-27 DIAGNOSIS — R11.0 NAUSEA: ICD-10-CM

## 2020-07-27 DIAGNOSIS — F10.10 ALCOHOL ABUSE: ICD-10-CM

## 2020-07-27 DIAGNOSIS — G47.30 SLEEP APNEA, UNSPECIFIED TYPE: ICD-10-CM

## 2020-07-27 DIAGNOSIS — Z30.011 ENCOUNTER FOR INITIAL PRESCRIPTION OF CONTRACEPTIVE PILLS: ICD-10-CM

## 2020-07-27 DIAGNOSIS — Z00.00 ROUTINE GENERAL MEDICAL EXAMINATION AT A HEALTH CARE FACILITY: Primary | ICD-10-CM

## 2020-07-27 DIAGNOSIS — I10 ESSENTIAL HYPERTENSION: ICD-10-CM

## 2020-07-27 DIAGNOSIS — L30.9 DERMATITIS: ICD-10-CM

## 2020-07-27 PROCEDURE — 99395 PREV VISIT EST AGE 18-39: CPT | Performed by: NURSE PRACTITIONER

## 2020-07-27 PROCEDURE — 87624 HPV HI-RISK TYP POOLED RSLT: CPT | Performed by: NURSE PRACTITIONER

## 2020-07-27 PROCEDURE — G0145 SCR C/V CYTO,THINLAYER,RESCR: HCPCS | Performed by: NURSE PRACTITIONER

## 2020-07-27 PROCEDURE — 80053 COMPREHEN METABOLIC PANEL: CPT | Performed by: NURSE PRACTITIONER

## 2020-07-27 PROCEDURE — 36415 COLL VENOUS BLD VENIPUNCTURE: CPT | Performed by: NURSE PRACTITIONER

## 2020-07-27 PROCEDURE — 84443 ASSAY THYROID STIM HORMONE: CPT | Performed by: NURSE PRACTITIONER

## 2020-07-27 RX ORDER — ACETAMINOPHEN AND CODEINE PHOSPHATE 120; 12 MG/5ML; MG/5ML
0.35 SOLUTION ORAL DAILY
Qty: 28 TABLET | Refills: 11 | Status: SHIPPED | OUTPATIENT
Start: 2020-07-27 | End: 2022-04-21

## 2020-07-27 RX ORDER — ONDANSETRON 4 MG/1
4 TABLET, FILM COATED ORAL EVERY 8 HOURS PRN
Qty: 60 TABLET | Refills: 2 | Status: SHIPPED | OUTPATIENT
Start: 2020-07-27 | End: 2021-01-13

## 2020-07-27 RX ORDER — HYDROCHLOROTHIAZIDE 25 MG/1
25 TABLET ORAL DAILY
Qty: 90 TABLET | Refills: 1 | Status: SHIPPED | OUTPATIENT
Start: 2020-07-27 | End: 2021-01-11

## 2020-07-27 RX ORDER — AMLODIPINE BESYLATE 5 MG/1
5 TABLET ORAL DAILY
Qty: 90 TABLET | Refills: 1 | Status: SHIPPED | OUTPATIENT
Start: 2020-07-27 | End: 2021-02-02

## 2020-07-27 RX ORDER — CLOBETASOL PROPIONATE 0.5 MG/G
CREAM TOPICAL 2 TIMES DAILY
Qty: 60 G | Refills: 1 | Status: SHIPPED | OUTPATIENT
Start: 2020-07-27

## 2020-07-27 ASSESSMENT — ENCOUNTER SYMPTOMS
CONSTIPATION: 1
CHILLS: 1
MYALGIAS: 1
FREQUENCY: 0
ABDOMINAL PAIN: 1
BREAST MASS: 1
HEMATOCHEZIA: 0
FEVER: 0
DIZZINESS: 1
PARESTHESIAS: 1
DIARRHEA: 0
SHORTNESS OF BREATH: 0
PALPITATIONS: 0
JOINT SWELLING: 0
COUGH: 1
HEMATURIA: 0
NAUSEA: 1
NERVOUS/ANXIOUS: 1
ARTHRALGIAS: 1
SORE THROAT: 0
DYSURIA: 0
HEADACHES: 1
EYE PAIN: 0
WEAKNESS: 0
HEARTBURN: 0

## 2020-07-27 ASSESSMENT — PAIN SCALES - GENERAL: PAINLEVEL: NO PAIN (0)

## 2020-07-27 ASSESSMENT — ANXIETY QUESTIONNAIRES
GAD7 TOTAL SCORE: 4
3. WORRYING TOO MUCH ABOUT DIFFERENT THINGS: SEVERAL DAYS
1. FEELING NERVOUS, ANXIOUS, OR ON EDGE: SEVERAL DAYS
5. BEING SO RESTLESS THAT IT IS HARD TO SIT STILL: NOT AT ALL
6. BECOMING EASILY ANNOYED OR IRRITABLE: SEVERAL DAYS
IF YOU CHECKED OFF ANY PROBLEMS ON THIS QUESTIONNAIRE, HOW DIFFICULT HAVE THESE PROBLEMS MADE IT FOR YOU TO DO YOUR WORK, TAKE CARE OF THINGS AT HOME, OR GET ALONG WITH OTHER PEOPLE: SOMEWHAT DIFFICULT
2. NOT BEING ABLE TO STOP OR CONTROL WORRYING: NOT AT ALL
7. FEELING AFRAID AS IF SOMETHING AWFUL MIGHT HAPPEN: NOT AT ALL

## 2020-07-27 ASSESSMENT — PATIENT HEALTH QUESTIONNAIRE - PHQ9
SUM OF ALL RESPONSES TO PHQ QUESTIONS 1-9: 12
5. POOR APPETITE OR OVEREATING: SEVERAL DAYS

## 2020-07-27 ASSESSMENT — MIFFLIN-ST. JEOR: SCORE: 1271.76

## 2020-07-27 NOTE — PROGRESS NOTES
"   SUBJECTIVE:   CC: Srinath Stewart is an 34 year old woman who presents for preventive health visit.     Healthy Habits:    Do you get at least three servings of calcium containing foods daily (dairy, green leafy vegetables, etc.)? { :599742::\"yes\"}    Amount of exercise or daily activities, outside of work: { :211454}    Problems taking medications regularly { :485735::\"No\"}    Medication side effects: { :061544::\"No\"}    Have you had an eye exam in the past two years? { :633991}    Do you see a dentist twice per year? { :127408}    Do you have sleep apnea, excessive snoring or daytime drowsiness?{ :144418}  {Outside tests to abstract? :349812}    {additional problems to add (Optional):776198}    Today's PHQ-2 Score:   PHQ-2 ( 1999 [employer]) 7/26/2020 8/19/2019   Q1: Little interest or pleasure in doing things 1 0   Q2: Feeling down, depressed or hopeless 1 0   PHQ-2 Score 2 0   Q1: Little interest or pleasure in doing things Several days -   Q2: Feeling down, depressed or hopeless Several days -   PHQ-2 Score 2 -     {PHQ-2 LOOK IN ASSESSMENTS (Optional) :223691}  Abuse: Current or Past(Physical, Sexual or Emotional)- {YES/NO/NA:265951}  Do you feel safe in your environment? {YES/NO/NA:195272}        Social History     Tobacco Use     Smoking status: Never Smoker     Smokeless tobacco: Never Used   Substance Use Topics     Alcohol use: Yes     If you drink alcohol do you typically have >3 drinks per day or >7 drinks per week? {ETOH :059992}                     Reviewed orders with patient.  Reviewed health maintenance and updated orders accordingly - {Yes/No:965461::\"Yes\"}  {Chronicprobdata (Optional):957144}    {Mammo Decision Support (Optional):540960}    Pertinent mammograms are reviewed under the imaging tab.  History of abnormal Pap smear: {PAP HX:284247}     Reviewed and updated as needed this visit by clinical staff         Reviewed and updated as needed this visit by Provider        {HISTORY OPTIONS " "(Optional):010223}    ROS:  { :005536}    OBJECTIVE:   There were no vitals taken for this visit.  EXAM:  {Exam Choices:427694}    {Diagnostic Test Results (Optional):481615::\"Diagnostic Test Results:\",\"Labs reviewed in Epic\"}    ASSESSMENT/PLAN:   {Diag Picklist:992375}    COUNSELING:   {FEMALE COUNSELING MESSAGES:216238::\"Reviewed preventive health counseling, as reflected in patient instructions\"}    Estimated body mass index is 26.64 kg/m  as calculated from the following:    Height as of 1/16/20: 1.549 m (5' 1\").    Weight as of 1/16/20: 64 kg (141 lb).    {Weight Management Plan (ACO) Complete if BMI is abnormal-  Ages 18-64  BMI >24.9.  Age 65+ with BMI <23 or >30 (Optional):066837}     reports that she has never smoked. She has never used smokeless tobacco.  {Tobacco Cessation -- Complete if patient is a smoker (Optional):856254}    Counseling Resources:  ATP IV Guidelines  Pooled Cohorts Equation Calculator  Breast Cancer Risk Calculator  FRAX Risk Assessment  ICSI Preventive Guidelines  Dietary Guidelines for Americans, 2010  USDA's MyPlate  ASA Prophylaxis  Lung CA Screening    April Chang NP  [facility]  "

## 2020-07-27 NOTE — PROGRESS NOTES
"   SUBJECTIVE:   CC: Srinath Stewart is an 34 year old woman who presents for preventive health visit.     Healthy Habits:     Getting at least 3 servings of Calcium per day:  Yes    Bi-annual eye exam:  Yes    Dental care twice a year:  Yes    Sleep apnea or symptoms of sleep apnea:  Sleep apnea    Diet:  Regular (no restrictions) and Breakfast skipped    Frequency of exercise:  1 day/week    Duration of exercise:  15-30 minutes    Taking medications regularly:  Yes    Medication side effects:  Other    PHQ-2 Total Score: 2    Additional concerns today:  Yes    Has sleep apnea: feels she may need another sleep study, last one was a long time ago. Just got a replacement machine a week ago but she is still \"jumping/jolting\" in her sleep.     Did virtual visit June, thought it was a UTI. Ended up in ER on 6/17/2020 due to vomiting, chills. ? Allergy to antibiotic macrobid and wondering if related to dehydration and ketosis.    Eczema worsening: all over arms/breasts/legs. Steroid cream not helping much    Working from home with COVID-19 going on. Going for walks. Not going out much. Feels mood is a little more decreased.     Smoking: vaping nicotine: daily multiple times throughout the day  Alcohol: daily, few glasses of wine, heavier drinking on the weekends. Admits she feels she had a problem with alcohol a while ago, but lately she feels her self going down that path. encouraged her to try to do other things like reading, prayer, being active in evenings. Advised if she needs help to let provider know.    Have more fatigue, not sure if related to mood/COVID-19 or something else    +constipation, likely related to lack of nutrition. Sometimes uses stool softeners    Hypertension Follow-up      Do you check your blood pressure regularly outside of the clinic? No     Are you following a low salt diet? No    Are your blood pressures ever more than 140 on the top number (systolic) OR more   than 90 on the bottom number " (diastolic), for example 140/90? Does not monitor BP outside of clinic visits     Anxiety Follow-Up    How are you doing with your anxiety since your last visit? Improved     Are you having other symptoms that might be associated with anxiety? No    Have you had a significant life event? No     Are you feeling depressed? No    Do you have any concerns with your use of alcohol or other drugs? No    Social History     Tobacco Use     Smoking status: Never Smoker     Smokeless tobacco: Never Used   Substance Use Topics     Alcohol use: Yes     Drug use: No     HAYDER-7 SCORE 7/27/2020   Total Score 4   Some encounter information is confidential and restricted. Go to Review Flowsheets activity to see all data.     PHQ 7/27/2020   PHQ-9 Total Score 12   Q9: Thoughts of better off dead/self-harm past 2 weeks Not at all   Some encounter information is confidential and restricted. Go to Review Flowsheets activity to see all data.     Last PHQ-9 7/27/2020   1.  Little interest or pleasure in doing things 2   2.  Feeling down, depressed, or hopeless 1   3.  Trouble falling or staying asleep, or sleeping too much 2   4.  Feeling tired or having little energy 2   5.  Poor appetite or overeating 2   6.  Feeling bad about yourself 1   7.  Trouble concentrating 2   8.  Moving slowly or restless 0   Q9: Thoughts of better off dead/self-harm past 2 weeks 0   PHQ-9 Total Score 12   Difficulty at work, home, or with people Somewhat difficult     HAYDER-7  7/27/2020   1. Feeling nervous, anxious, or on edge 1   2. Not being able to stop or control worrying 0   3. Worrying too much about different things 1   4. Trouble relaxing 1   5. Being so restless that it is hard to sit still 0   6. Becoming easily annoyed or irritable 1   7. Feeling afraid, as if something awful might happen 0   HAYDER-7 Total Score 4   If you checked any problems, how difficult have they made it for you to do your work, take care of things at home, or get along with other  people? Somewhat difficult         How many servings of fruits and vegetables do you eat daily?  2-3    On average, how many sweetened beverages do you drink each day (Examples: soda, juice, sweet tea, etc.  Do NOT count diet or artificially sweetened beverages)?   1    How many days per week do you exercise enough to make your heart beat faster? 3 or less    How many minutes a day do you exercise enough to make your heart beat faster? 20 - 29    How many days per week do you miss taking your medication? 0         Today's PHQ-2 Score:   PHQ-2 ( 1999 Pf[employer]) 7/26/2020   Q1: Little interest or pleasure in doing things 1   Q2: Feeling down, depressed or hopeless 1   PHQ-2 Score 2   Q1: Little interest or pleasure in doing things Several days   Q2: Feeling down, depressed or hopeless Several days   PHQ-2 Score 2       Abuse: Current or Past(Physical, Sexual or Emotional)- No  Do you feel safe in your environment? Yes      Social History     Tobacco Use     Smoking status: Never Smoker     Smokeless tobacco: Never Used   Substance Use Topics     Alcohol use: Yes     If you drink alcohol do you typically have >3 drinks per day or >7 drinks per week? Yes      AUDIT - Alcohol Use Disorders Identification Test - Reproduced from the World Health Organization Audit 2001 (Second Edition) 7/26/2020   1.  How often do you have a drink containing alcohol? 4 or more times a week   2.  How many drinks containing alcohol do you have on a typical day when you are drinking? 3 or 4   3.  How often do you have five or more drinks on one occasion? Weekly   4.  How often during the last year have you found that you were not able to stop drinking once you had started? Weekly   5.  How often during the last year have you failed to do what was normally expected of you because of drinking? Never   6.  How often during the last year have you needed a first drink in the morning to get yourself going after a heavy drinking session? Never   7.   How often during the last year have you had a feeling of guilt or remorse after drinking? Monthly   8.  How often during the last year have you been unable to remember what happened the night before because of your drinking? Less than monthly   9.  Have you or someone else been injured because of your drinking? No   10. Has a relative, friend, doctor or other health care worker been concerned about your drinking or suggested you cut down? Yes, but not in the last year   TOTAL SCORE 16       Reviewed orders with patient.  Reviewed health maintenance and updated orders accordingly - Yes  Lab work is in process  Labs reviewed in [facility]    Mammogram not appropriate for this patient based on age.    Pertinent mammograms are reviewed under the imaging tab.  History of abnormal Pap smear: NO - age 30-65 PAP every 5 years with negative HPV co-testing recommended     Reviewed and updated as needed this visit by clinical staff  Tobacco  Allergies  Meds  Med Hx  Surg Hx  Fam Hx  Soc Hx        Reviewed and updated as needed this visit by Provider            Review of Systems   Constitutional: Positive for chills. Negative for fever.   HENT: Positive for congestion. Negative for ear pain, hearing loss and sore throat.    Eyes: Negative for pain and visual disturbance.   Respiratory: Positive for cough. Negative for shortness of breath.    Cardiovascular: Negative for chest pain, palpitations and peripheral edema.   Gastrointestinal: Positive for abdominal pain, constipation and nausea. Negative for diarrhea, heartburn and hematochezia.   Breasts:  Positive for tenderness and breast mass. Negative for discharge.   Genitourinary: Negative for dysuria, frequency, genital sores, hematuria, pelvic pain, urgency, vaginal bleeding and vaginal discharge.   Musculoskeletal: Positive for arthralgias and myalgias. Negative for joint swelling.   Skin: Negative for rash.   Neurological: Positive for dizziness, headaches and paresthesias.  "Negative for weakness.   Psychiatric/Behavioral: Positive for mood changes. The patient is nervous/anxious.        OBJECTIVE:   /80 (BP Location: Left arm, Patient Position: Chair, Cuff Size: Adult Regular)   Pulse 89   Temp 97.9  F (36.6  C) (Tympanic)   Resp 20   Ht 1.549 m (5' 1\")   Wt 62.4 kg (137 lb 10.4 oz)   LMP 07/13/2020 (Exact Date)   SpO2 100%   Breastfeeding No   BMI 26.01 kg/m    Physical Exam  GENERAL: healthy, alert and no distress  EYES: Eyes grossly normal to inspection, PERRL and conjunctivae and sclerae normal  HENT: ear canals and TM's normal, nose and mouth without ulcers or lesions  NECK: no adenopathy, no asymmetry, masses, or scars and thyroid normal to palpation  RESP: lungs clear to auscultation - no rales, rhonchi or wheezes  BREAST: normal without masses, tenderness or nipple discharge and no palpable axillary masses or adenopathy  CV: regular rate and rhythm, normal S1 S2, no S3 or S4, no murmur, click or rub, no peripheral edema  ABDOMEN: soft, slight discomfort RLQ, no hepatosplenomegaly, no masses and bowel sounds normal   (female): normal female external genitalia, normal urethral meatus, vaginal mucosa pink, moist, well rugated, and normal cervix/adnexa/uterus without masses or discharge  MS: no gross musculoskeletal defects note  SKIN: bilateral arms, left breast with skin discoloration with slightly scaly/rough patches noted. Some scratch marks noted that appear to have bled recently but no active bleeding  NEURO: Normal strength and tone, mentation intact and speech normal  PSYCH: mentation appears normal, affect normal    Diagnostic Test Results:  Labs reviewed in Epic  No results found for this or any previous visit (from the past 24 hour(s)).    ASSESSMENT/PLAN:   1. Routine general medical examination at a health care facility  Exam stable    2. Essential hypertension  Stable, lab check  - Comprehensive metabolic panel (BMP + Alb, Alk Phos, ALT, AST, Total. " "Ambrose, BIJAL)  - amLODIPine (NORVASC) 5 MG tablet; Take 1 tablet (5 mg) by mouth daily  Dispense: 90 tablet; Refill: 1  - hydrochlorothiazide (HYDRODIURIL) 25 MG tablet; Take 1 tablet (25 mg) by mouth daily  Dispense: 90 tablet; Refill: 1    3. Encounter for initial prescription of contraceptive pills  Refill sent  - norethindrone (SELVIN) 0.35 MG tablet; Take 1 tablet (0.35 mg) by mouth daily  Dispense: 28 tablet; Refill: 11    4. Dermatitis  Trial instead of kenalog cream. If not helping follow up with Dermatology   - clobetasol (TEMOVATE) 0.05 % external cream; Apply topically 2 times daily  Dispense: 60 g; Refill: 1  - DERMATOLOGY REFERRAL    5. Nausea  Encouraged to eat, decrease alcohol use, trial zofran for nausea to eat more  - ondansetron (ZOFRAN) 4 MG tablet; Take 1 tablet (4 mg) by mouth every 8 hours as needed for nausea  Dispense: 60 tablet; Refill: 2    6. Screening for malignant neoplasm of cervix  done  - Pap imaged thin layer screen with HPV - recommended age 30 - 65 years (select HPV order below)    7. Other fatigue  Screening for cause  - TSH with free T4 reflex    8. Sleep apnea, unspecified type  Follow up with sleep center  - SLEEP EVALUATION & MANAGEMENT REFERRAL - ADULT -[facility] [place] - [phone]; Future    9. Alcohol abuse  Encouraged to cut back. She is also vaping, encouraged to start with alcohol then we can consider working on her smoking    COUNSELING:  Reviewed preventive health counseling, as reflected in patient instructions    Estimated body mass index is 26.01 kg/m  as calculated from the following:    Height as of this encounter: 1.549 m (5' 1\").    Weight as of this encounter: 62.4 kg (137 lb 10.4 oz).         reports that she has never smoked. She has never used smokeless tobacco.  Tobacco Cessation Action Plan: Information offered: Patient not interested at this time    Counseling Resources:  ATP IV Guidelines  Pooled Cohorts Equation " Calculator  Breast Cancer Risk Calculator  FRAX Risk Assessment  ICSI Preventive Guidelines  Dietary Guidelines for Americans, 2010  USDA's MyPlate  ASA Prophylaxis  Lung CA Screening    CASANDRA Méndez, NP-C  [facility]

## 2020-07-28 LAB
ALBUMIN SERPL-MCNC: 4.1 G/DL (ref 3.4–5)
ALP SERPL-CCNC: 46 U/L (ref 40–150)
ALT SERPL W P-5'-P-CCNC: 180 U/L (ref 0–50)
ANION GAP SERPL CALCULATED.3IONS-SCNC: 14 MMOL/L (ref 3–14)
AST SERPL W P-5'-P-CCNC: 325 U/L (ref 0–45)
BILIRUB SERPL-MCNC: 0.7 MG/DL (ref 0.2–1.3)
BUN SERPL-MCNC: 7 MG/DL (ref 7–30)
CALCIUM SERPL-MCNC: 9.6 MG/DL (ref 8.5–10.1)
CHLORIDE SERPL-SCNC: 96 MMOL/L (ref 94–109)
CO2 SERPL-SCNC: 26 MMOL/L (ref 20–32)
CREAT SERPL-MCNC: 0.74 MG/DL (ref 0.52–1.04)
GFR SERPL CREATININE-BSD FRML MDRD: >90 ML/MIN/{1.73_M2}
GLUCOSE SERPL-MCNC: 85 MG/DL (ref 70–99)
POTASSIUM SERPL-SCNC: 3.3 MMOL/L (ref 3.4–5.3)
PROT SERPL-MCNC: 8.4 G/DL (ref 6.8–8.8)
SODIUM SERPL-SCNC: 136 MMOL/L (ref 133–144)
TSH SERPL DL<=0.005 MIU/L-ACNC: 0.84 MU/L (ref 0.4–4)

## 2020-07-28 ASSESSMENT — ANXIETY QUESTIONNAIRES: GAD7 TOTAL SCORE: 4

## 2020-07-29 DIAGNOSIS — R74.8 ELEVATED LIVER ENZYMES: Primary | ICD-10-CM

## 2020-07-31 LAB
COPATH REPORT: NORMAL
PAP: NORMAL

## 2020-08-05 LAB
FINAL DIAGNOSIS: NORMAL
HPV HR 12 DNA CVX QL NAA+PROBE: NEGATIVE
HPV16 DNA SPEC QL NAA+PROBE: NEGATIVE
HPV18 DNA SPEC QL NAA+PROBE: NEGATIVE
SPECIMEN DESCRIPTION: NORMAL
SPECIMEN SOURCE CVX/VAG CYTO: NORMAL

## 2020-09-08 ENCOUNTER — ANCILLARY PROCEDURE (OUTPATIENT)
Dept: ULTRASOUND IMAGING | Facility: CLINIC | Age: 35
End: 2020-09-08
Attending: NURSE PRACTITIONER
Payer: COMMERCIAL

## 2020-09-08 DIAGNOSIS — R74.8 ELEVATED LIVER ENZYMES: ICD-10-CM

## 2020-09-08 PROCEDURE — 76700 US EXAM ABDOM COMPLETE: CPT | Performed by: RADIOLOGY

## 2020-09-09 DIAGNOSIS — R74.8 ELEVATED LIVER ENZYMES: Primary | ICD-10-CM

## 2020-09-09 NOTE — RESULT ENCOUNTER NOTE
Suhail Yo,   Your ultrasound shows thickened liver this is likely due to diet/alcohol use. Please cut back on alcohol to avoid long term damage to your liver. There is still time to potentially reverse the fatty liver related to alcohol and/or diet. We should repeat your liver enzymes in the next few weeks so make a lab only appointment for that. Please call if you have questions.   Thank you,  CASANDRA Méndez, NP-C  [facility]

## 2021-01-03 ENCOUNTER — HEALTH MAINTENANCE LETTER (OUTPATIENT)
Age: 36
End: 2021-01-03

## 2021-01-11 ENCOUNTER — OFFICE VISIT (OUTPATIENT)
Dept: FAMILY MEDICINE | Facility: CLINIC | Age: 36
End: 2021-01-11
Payer: COMMERCIAL

## 2021-01-11 VITALS
BODY MASS INDEX: 26.85 KG/M2 | SYSTOLIC BLOOD PRESSURE: 135 MMHG | RESPIRATION RATE: 16 BRPM | OXYGEN SATURATION: 99 % | DIASTOLIC BLOOD PRESSURE: 82 MMHG | WEIGHT: 142.1 LBS | HEART RATE: 111 BPM

## 2021-01-11 DIAGNOSIS — D25.0 INTRAMURAL AND SUBMUCOUS LEIOMYOMA OF UTERUS: Primary | ICD-10-CM

## 2021-01-11 DIAGNOSIS — I10 ESSENTIAL HYPERTENSION: ICD-10-CM

## 2021-01-11 DIAGNOSIS — D25.1 INTRAMURAL AND SUBMUCOUS LEIOMYOMA OF UTERUS: Primary | ICD-10-CM

## 2021-01-11 DIAGNOSIS — R74.8 ELEVATED LIVER ENZYMES: ICD-10-CM

## 2021-01-11 LAB
ALBUMIN SERPL-MCNC: 4 G/DL (ref 3.4–5)
ALP SERPL-CCNC: 43 U/L (ref 40–150)
ALT SERPL W P-5'-P-CCNC: 119 U/L (ref 0–50)
ANION GAP SERPL CALCULATED.3IONS-SCNC: 5 MMOL/L (ref 3–14)
AST SERPL W P-5'-P-CCNC: 205 U/L (ref 0–45)
BILIRUB SERPL-MCNC: 0.2 MG/DL (ref 0.2–1.3)
BUN SERPL-MCNC: 12 MG/DL (ref 7–30)
CALCIUM SERPL-MCNC: 9.1 MG/DL (ref 8.5–10.1)
CHLORIDE SERPL-SCNC: 105 MMOL/L (ref 94–109)
CO2 SERPL-SCNC: 32 MMOL/L (ref 20–32)
CREAT SERPL-MCNC: 0.72 MG/DL (ref 0.52–1.04)
GFR SERPL CREATININE-BSD FRML MDRD: >90 ML/MIN/{1.73_M2}
GLUCOSE SERPL-MCNC: 89 MG/DL (ref 70–99)
POTASSIUM SERPL-SCNC: 3.2 MMOL/L (ref 3.4–5.3)
PROT SERPL-MCNC: 7.9 G/DL (ref 6.8–8.8)
SODIUM SERPL-SCNC: 142 MMOL/L (ref 133–144)

## 2021-01-11 PROCEDURE — 36415 COLL VENOUS BLD VENIPUNCTURE: CPT | Performed by: NURSE PRACTITIONER

## 2021-01-11 PROCEDURE — 99214 OFFICE O/P EST MOD 30 MIN: CPT | Performed by: NURSE PRACTITIONER

## 2021-01-11 PROCEDURE — 80053 COMPREHEN METABOLIC PANEL: CPT | Performed by: NURSE PRACTITIONER

## 2021-01-11 NOTE — PROGRESS NOTES
Assessment & Plan     Intramural and submucous leiomyoma of uterus  Follow up with OB on fibroids noted from ER ultrasound on 1/2/2021, ER report reviewed, and if need to be removed or not. Causing significant pain and heavy/longer menses. Recommend repeat US in 6-8 weeks due to possible mass.     - OB/GYN REFERRAL    Elevated liver enzymes/  ? Related to birth control and/or medication. Stop hydrochlorothiazide. Monitor alcohol intake, abdominal US negative in 2020. Consider doing hepatitis screening if still high or repeat labs in a month while off medication?     Essential hypertension   Stop hydrochlorothiazide, at home BP is lower in 115s. Advisd to monitor BP/HR at home, want to keep less than 140/90, but not lower then 100-110 systolically. Update provider in a week on how BP/HR are ranging with just amlodipine 5 mg daily  - Comprehensive metabolic panel (BMP + Alb, Alk Phos, ALT, AST, Total. Bili, TP)    Review of external notes as documented above   Review of the result(s) of each unique test - as above         Return in about 4 months (around 5/11/2021) for Phone or Video visit okay, BP Recheck.    CASANDRA Méndez, NP-C  [facility]     Srinath is a 35 year old who presents to clinic today for the following health issues    HPI       ED/UC Followup:    Facility: [facility]   Date of visit: 1/2/21  Reason for visit: blood in urine   Current Status: follow up      Tried 800 mg ibuprofen, less cramping, less bleeding now.  US noted multiple fibroids and possible mass noted in right ovary.   BP at home: lately has been low but in the past has been high. Call for refills.     Alcohol: not as much as before.   Vape: few times a day    Review of Systems   Constitutional, cardiovascular, pulmonary, gi and gu-as above systems are negative, except as otherwise noted.      Objective    /82   Pulse 111   Resp 16   Wt 64.5 kg (142 lb 1.6 oz)   SpO2 99%   BMI 26.85 kg/m     Body mass index is 26.85 kg/m .  Physical Exam   GENERAL: healthy, alert and no distress  RESP: lungs clear to auscultation - no rales, rhonchi or wheezes  CV: regular rate and rhythm, normal S1 S2, no S3 or S4, no murmur, click or rub  ABDOMEN: soft, slight discomfort in lower pelvic area, no hepatosplenomegaly, no masses and bowel sounds normal  MS: no gross musculoskeletal defects noted, no edema    Results for orders placed or performed in visit on 01/11/21 (from the past 24 hour(s))   Comprehensive metabolic panel (BMP + Alb, Alk Phos, ALT, AST, Total. Bili, TP)   Result Value Ref Range    Sodium 142 133 - 144 mmol/L    Potassium 3.2 (L) 3.4 - 5.3 mmol/L    Chloride 105 94 - 109 mmol/L    Carbon Dioxide PENDING 20 - 32 mmol/L    Anion Gap PENDING 3 - 14 mmol/L    Glucose PENDING 70 - 99 mg/dL    Urea Nitrogen PENDING 7 - 30 mg/dL    Creatinine PENDING 0.52 - 1.04 mg/dL    GFR Estimate PENDING >60 mL/min/[1.73_m2]    GFR Estimate If Black PENDING >60 mL/min/[1.73_m2]    Calcium PENDING 8.5 - 10.1 mg/dL    Bilirubin Total PENDING 0.2 - 1.3 mg/dL    Albumin PENDING 3.4 - 5.0 g/dL    Protein Total PENDING 6.8 - 8.8 g/dL    Alkaline Phosphatase PENDING 40 - 150 U/L    ALT PENDING 0 - 50 U/L    AST PENDING 0 - 45 U/L

## 2021-01-11 NOTE — RESULT ENCOUNTER NOTE
Hi Latyra,   Your potassium is slightly low, that will improve as you are off the diuretic. Your liver tests are slightly improved from 5 months ago but still high. Please work on decreasing your alcohol intake. We can also get an opinion from OB to see if the birth control is contributing to this. Please let me know if you have questions, otherwise recommend repeat labs in 3-4 months.   Thank you,  CASANDRA Méndez, NP-C  [facility]

## 2021-01-13 ENCOUNTER — VIRTUAL VISIT (OUTPATIENT)
Dept: FAMILY MEDICINE | Facility: CLINIC | Age: 36
End: 2021-01-13
Payer: COMMERCIAL

## 2021-01-13 DIAGNOSIS — L30.9 DERMATITIS: Primary | ICD-10-CM

## 2021-01-13 DIAGNOSIS — G47.30 SLEEP APNEA, UNSPECIFIED TYPE: ICD-10-CM

## 2021-01-13 PROCEDURE — 99213 OFFICE O/P EST LOW 20 MIN: CPT | Mod: 95 | Performed by: PREVENTIVE MEDICINE

## 2021-01-13 RX ORDER — PREDNISONE 20 MG/1
20 TABLET ORAL 2 TIMES DAILY
Qty: 10 TABLET | Refills: 0 | Status: SHIPPED | OUTPATIENT
Start: 2021-01-13 | End: 2021-01-18

## 2021-01-13 RX ORDER — DESONIDE 0.5 MG/G
CREAM TOPICAL 2 TIMES DAILY
Qty: 30 G | Refills: 0 | Status: SHIPPED | OUTPATIENT
Start: 2021-01-13

## 2021-01-13 NOTE — PROGRESS NOTES
Srinath is a 35 year old who is being evaluated via a billable video visit.      How would you like to obtain your AVS? MyChart  If the video visit is dropped, the invitation should be resent by: Text to cell phone: As listed in chart  Will anyone else be joining your video visit? No    Video Start Time: 3:23 PM  Assessment & Plan     Dermatitis  -use Vanicream or Vaseline  -if not better in 1 week, then refer to Dermatology   - predniSONE (DELTASONE) 20 MG tablet  Dispense: 10 tablet; Refill: 0  - desonide (DESOWEN) 0.05 % external cream  Dispense: 30 g; Refill: 0  -ER precautions: wheezing, tongue edema, fever, vision changes.     Sleep apnea, unspecified type  -consistent use of CPAP  -will also mention rash to her Sleep Specialist at follow up           20 minutes spent on the date of the encounter doing chart review, history and exam, documentation and further activities as noted above         Return in about 1 week (around 1/20/2021) if symptoms worsen or fail to improve.    Divina Jamil MD MPH    [facility]      Srinath is a 35 year old who presents to clinic today for the following health issuesHPI       Rash  Onset/Duration: 2 days, milder symptoms for weeks but significant increase in the last 2 days   Description  Location: Face, around mouth and around eyes   Character: flakey  Itching: moderate  Intensity:  moderate  Progression of Symptoms:  worsening  Accompanying signs and symptoms:   Fever: no  Body aches or joint pain: no  Sore throat symptoms: no  Recent cold symptoms: no  History:           Previous episodes of similar rash: None  New exposures:  None  Recent travel: no  Exposure to similar rash: no  Precipitating or alleviating factors: None   Therapies tried and outcome: none      This morning eyes have been swollen  Flaky around the eyes.  No past history  No changes in medication, soaps or cosmetics  Uses a CPAP machine, did change device end of last  month.   No loss of vision  No severe headaches  No purulent drainage  No crusting  Some hyperpigmentation around the eyes and mouth  No tongue or lip swelling  No wheezing  No shortness of breath  No bronchoconstriction       Review of Systems   Constitutional, HEENT, cardiovascular, pulmonary, gi and gu systems are negative, except as otherwise noted.      Objective           Vitals:  No vitals were obtained today due to virtual visit.    Physical Exam   GENERAL: Healthy, alert and no distress  EYES: Eyes grossly normal to inspection.  No discharge or erythema, or obvious scleral/conjunctival abnormalities.RESP: No audible wheeze, cough, or visible cyanosis.  No visible retractions or increased work of breathing.    SKIN: Dryness, flakiness and mild hyperpigmentation around the eyes and mouth.    NEURO: Cranial nerves grossly intact.  Mentation and speech appropriate for age.  PSYCH: Mentation appears normal, affect normal/bright, judgement and insight intact, normal speech and appearance well-groomed.            Video-Visit Details    Type of service:  Video Visit    Video End Time:3:35 PM    Originating Location (pt. Location): Home    Distant Location (provider location):  [facility]     Platform used for Video Visit: SiConnect

## 2021-01-28 ENCOUNTER — OFFICE VISIT (OUTPATIENT)
Dept: OBGYN | Facility: CLINIC | Age: 36
End: 2021-01-28
Payer: COMMERCIAL

## 2021-01-28 VITALS
BODY MASS INDEX: 26.91 KG/M2 | SYSTOLIC BLOOD PRESSURE: 134 MMHG | OXYGEN SATURATION: 96 % | HEART RATE: 104 BPM | WEIGHT: 142.4 LBS | DIASTOLIC BLOOD PRESSURE: 82 MMHG

## 2021-01-28 DIAGNOSIS — D25.2 INTRAMURAL, SUBMUCOUS, AND SUBSEROUS LEIOMYOMA OF UTERUS: ICD-10-CM

## 2021-01-28 DIAGNOSIS — D25.1 INTRAMURAL, SUBMUCOUS, AND SUBSEROUS LEIOMYOMA OF UTERUS: ICD-10-CM

## 2021-01-28 DIAGNOSIS — N93.9 ABNORMAL UTERINE BLEEDING: ICD-10-CM

## 2021-01-28 DIAGNOSIS — N83.8 OVARIAN MASS, RIGHT: Primary | ICD-10-CM

## 2021-01-28 DIAGNOSIS — D25.0 INTRAMURAL, SUBMUCOUS, AND SUBSEROUS LEIOMYOMA OF UTERUS: ICD-10-CM

## 2021-01-28 PROCEDURE — 99203 OFFICE O/P NEW LOW 30 MIN: CPT | Performed by: OBSTETRICS & GYNECOLOGY

## 2021-01-28 NOTE — PROGRESS NOTES
Srinath is a 35 year old female .  I have been asked to see patient in consultation by April Chang regarding uterine leiomyoma.  She has regular cycles.   She had cycle  and the had bleeding 5 to 7 days.  She then started bleeding again with heavy clotting about a week later.  She was using tampons and pads.  She had urgency to get to the bathroom due to passing clots.  She was dizzy, with a little back pain.  She had associated fatigue.  She has been on the progestin only OCPs.  She has been on combination OCPs in the past, but when she was diagnosed with HTN, she was switched to the progestin only OCPs.      She went to [facility] for abnormal uterine bleeding and she had the ultrasound performed there.  I reviewed the ultrasound report with her, but since it is [facility], I am not able to view the films.    2021  1:26 PM CST  INDICATION:  Vaginal bleeding. Pelvic pain for 2 weeks.  Technique :  Transabdominal and transvaginal ultrasound including grayscale/2D, color and spectral Doppler imaging with the latter imaging being performed confirm blood the ovaries and exclude ovarian torsion given the presence of pain.   FINDINGS:  Moderately enlarged bulbous, lobulated, and heterogeneous uterus measuring 9 point x 5.9 x 6.4 cm. Multiple solid masses in the uterus essentially replacing the uterine myometrium including the 2 most dominant masses including 1 mass in the mid uterus anteriorly measuring 3.7 x 3.5 x 2.9 cm and 1 mass in the mid uterus posteriorly measuring 3.1 x 2.6 x 2.2 cm. Findings consistent with uterine fibroids. Numerous additional fibroids within the uterus. Trace amount of free fluid in the endocervix. Small amount of free fluid in the pelvis posteriorly. Endometrial stripe not well visualized but where seen in the fundal region measures 9-10 mm which is normal in thickness. Relationship of the myometrial masses to the endometrium is not well characterized on this study. Right ovary is  enlarged at 5.9 x 3.4 x 4.9 cm right ovary contains a solid-appearing hyperechoic lesion measuring 3.8 x 3.4 x 3.4 cm. This could be a hemorrhagic cyst or endometrioma mimicking a solid lesion but would be worthwhile reassessing with a followup ultrasound in 6-8 weeks. Arterial and venous blood flow confirmed the right ovary. Remainder negative.  IMPRESSION:  1. Enlarged heterogeneous lobulated uterus containing multiple solid masses consistent with fibroids the largest which measure up to nearly 4 cm. Relationship of the fibroids myometrial with regard to the endometrium not well characterized on this study.  2. Endometrial stripe not well visualized but where seen normal in thickness without focal abnormalities in the fundal region.  3. Enlarged right ovary containing a 3.8 cm hyperechoic solid appearing mass which does not contain significant blood flow and may be related to a complex cystic lesion containing debris and hemorrhagic products such as a hemorrhagic cyst or endometrioma mimicking a solid mass but cannot entirely exclude a solid mass. Recommend followup pelvic ultrasound in 6-8 weeks to reassess whether this persists or resolves. A physiologic lesion should resolve over the interval.  4. Left ovary not visualized.  5. Small amount of free fluid in the endocervix.  6. Small amount of free fluid in the pelvis posteriorly      Past Medical History:   Diagnosis Date     Eczema      Hyperlipidemia      Hypertension      Sleep apnea        Past Surgical History:   Procedure Laterality Date     HERNIA REPAIR         OB History    Para Term  AB Living   0 0 0 0 0 0   SAB TAB Ectopic Multiple Live Births   0 0 0 0 0       Gynecological History         Patient's last menstrual period was 2020 (approximate).     No STD/No PID/No IUD   No abnormal pap smear       see above HPI        Allergies   Allergen Reactions     Ace Inhibitors Swelling     Lisinopril     Macrobid [Nitrofurantoin]  Nausea and Vomiting       Current Outpatient Medications   Medication Sig Dispense Refill     amLODIPine (NORVASC) 5 MG tablet Take 1 tablet (5 mg) by mouth daily 90 tablet 1     clobetasol (TEMOVATE) 0.05 % external cream Apply topically 2 times daily 60 g 1     desonide (DESOWEN) 0.05 % external cream Apply topically 2 times daily 30 g 0     norethindrone (SELVIN) 0.35 MG tablet Take 1 tablet (0.35 mg) by mouth daily 28 tablet 11       Social History     Socioeconomic History     Marital status:      Spouse name: Not on file     Number of children: Not on file     Years of education: Not on file     Highest education level: Not on file   Occupational History     Not on file   Social Needs     Financial resource strain: Not on file     Food insecurity     Worry: Not on file     Inability: Not on file     Transportation needs     Medical: Not on file     Non-medical: Not on file   Tobacco Use     Smoking status: Never Smoker     Smokeless tobacco: Never Used   Substance and Sexual Activity     Alcohol use: Yes     Drug use: No     Sexual activity: Yes     Partners: Male     Birth control/protection: Pill   Lifestyle     Physical activity     Days per week: Not on file     Minutes per session: Not on file     Stress: Not on file   Relationships     Social connections     Talks on phone: Not on file     Gets together: Not on file     Attends [employer] service: Not on file     Active member of club or organization: Not on file     Attends meetings of clubs or organizations: Not on file     Relationship status: Not on file     Intimate partner violence     Fear of current or ex partner: Not on file     Emotionally abused: Not on file     Physically abused: Not on file     Forced sexual activity: Not on file   Other Topics Concern     Parent/sibling w/ CABG, MI or angioplasty before 65F 55M? Not Asked   Social History Narrative     Not on file       Family History   Problem Relation Age of Onset     Hypertension  Father      Other - See Comments Father         brain anyurism dx 2018     Alcoholism Father         hasn't drank since 2018     Depression Maternal Grandmother      Asthma Nephew      Eczema Nephew      Unknown/Adopted Mother        Review of Systems:  10 point ROS of systems including Constitutional, Eyes, Respiratory, Cardiovascular, Gastroenterology, Genitourinary, Integumentary, Muscularskeletal, Psychiatric were all negative except for pertinent positives noted in my HPI and in the PMH.      EXAM:  /82 (BP Location: Right arm, Cuff Size: Adult Regular)   Pulse 104   Wt 64.6 kg (142 lb 6.4 oz)   LMP 12/12/2020 (Approximate)   SpO2 96%   Breastfeeding No   BMI 26.91 kg/m    Body mass index is 26.91 kg/m .  General:  WNWD female, NAD  Alert  Oriented x 3  Gait:  Normal  Skin:  Normal skin turgor  HEENT:  NC/AT, EOMI  Neck:  No masses noted, symmetrical  Lungs:  Good respiratory effort   Pelvic exam:  Not performed   Extremities:  No clubbing, cyanosis or edema.         ASSESSMENT:  Uterine leiomyoma  Ovarian mass  Abnormal uterine bleeding       PLAN:  The patient has been on the progestin only OCPs.  We reviewed the progestin only OCPs and her bleeding is likely related with them and the uterine leiomyoma with breakthrough bleeding likely related to both.  We discussed evaluation with EMB, SIS and the D&C, but at this time the progestin only OCPs have a protective effect regarding endometrial hyperplasia and malignancies.    The other finding of the ultrasound that might be more concern regarding possible abnormalities and possible cancer is the solid appearing ovarian mass.  It might be a hemorrhagic cyst with debris, although I would suspect the reading would be a bit different on the description.   With a solid mass, it might be a uterine fibroid in the are of the ovary.  Radiology advises to have the repeat ultrasound for the interval follow up.  The follow up ultrasound is ordered and scheduled  for her for about 6 weeks after the [facility] ultrasound.      TT with the patient face to face is over 40 min with discussion of the symptoms and the findings, as noted above in the HPI and in the Plan.     Ye Cabrera MD

## 2021-01-31 DIAGNOSIS — I10 ESSENTIAL HYPERTENSION: ICD-10-CM

## 2021-01-31 DIAGNOSIS — R74.8 ELEVATED LIVER ENZYMES: Primary | ICD-10-CM

## 2021-01-31 NOTE — TELEPHONE ENCOUNTER
Pharmacy is also requesting refill for this medication.    hydrochlorothiazide (HYDRODIURIL) 25 MG tablet (Discontinued) 90 tablet 1 7/27/2020 1/11/2021           Raymundo Faarax  [facility]

## 2021-01-31 NOTE — LETTER
2021      Tomascorey Stewart  [address]   [place] [address]              Dear Srinath Quintanillalas      Unable to contact you by phone. This is your provider recommendations to repeat labs in 3-4 months, we should really repeat sooner to ensure potassium is increased and liver enzymes are not worsening. You can make lab only appointment in the next few weeks. Orders are in.         If you have any questions or concerns, please call myself or my nurse at [phone].      Sincerely,      CASANDRA Méndez, NP-C/D. BETTIE Gallego  [employer]                  RE: Srinath Stewart   MRN: [id number]  : 1985  ENC DATE: 2021

## 2021-02-02 RX ORDER — AMLODIPINE BESYLATE 5 MG/1
5 TABLET ORAL DAILY
Qty: 90 TABLET | Refills: 0 | Status: SHIPPED | OUTPATIENT
Start: 2021-02-02

## 2021-02-02 NOTE — TELEPHONE ENCOUNTER
Refilled amlodipine. Patient is no longer on hydrochlorothiazide. Please call patient: while provider recommend to repeat labs in 3-4 months, we should really repeat sooner to ensure potassium is increased and liver enzymes are not worsening. She can make lab only appointment in the next few weeks. Orders are in.   Thank you,  CASANDRA Méndez, NP-C  [facility]

## 2021-02-02 NOTE — TELEPHONE ENCOUNTER
Routing refill request to provider for review/approval because:  Drug not active on patient's medication list  Hydrochlorothiazide    Lucinda Jones BSN, RN

## 2021-02-04 NOTE — TELEPHONE ENCOUNTER
This writer attempted to contact patient on 02/04/21      Reason for call need lab appointment and left message.      If patient calls back:   [facility] Care Team (MA/TC) called. Inform patient that someone from the team will contact them, document that pt called and route to care team.         Lu Gallego MA

## 2021-02-15 ENCOUNTER — ANCILLARY PROCEDURE (OUTPATIENT)
Dept: ULTRASOUND IMAGING | Facility: CLINIC | Age: 36
End: 2021-02-15
Attending: OBSTETRICS & GYNECOLOGY
Payer: COMMERCIAL

## 2021-02-15 DIAGNOSIS — D25.1 INTRAMURAL, SUBMUCOUS, AND SUBSEROUS LEIOMYOMA OF UTERUS: ICD-10-CM

## 2021-02-15 DIAGNOSIS — D25.0 INTRAMURAL, SUBMUCOUS, AND SUBSEROUS LEIOMYOMA OF UTERUS: ICD-10-CM

## 2021-02-15 DIAGNOSIS — N83.8 OVARIAN MASS, RIGHT: ICD-10-CM

## 2021-02-15 DIAGNOSIS — D25.2 INTRAMURAL, SUBMUCOUS, AND SUBSEROUS LEIOMYOMA OF UTERUS: ICD-10-CM

## 2021-02-15 PROCEDURE — 76856 US EXAM PELVIC COMPLETE: CPT | Performed by: STUDENT IN AN ORGANIZED HEALTH CARE EDUCATION/TRAINING PROGRAM

## 2021-02-15 PROCEDURE — 76830 TRANSVAGINAL US NON-OB: CPT | Performed by: STUDENT IN AN ORGANIZED HEALTH CARE EDUCATION/TRAINING PROGRAM

## 2021-02-25 ENCOUNTER — VIRTUAL VISIT (OUTPATIENT)
Dept: OBGYN | Facility: CLINIC | Age: 36
End: 2021-02-25
Payer: COMMERCIAL

## 2021-02-25 DIAGNOSIS — N83.8 OVARIAN MASS, RIGHT: ICD-10-CM

## 2021-02-25 DIAGNOSIS — N92.1 BREAKTHROUGH BLEEDING ON BIRTH CONTROL PILLS: ICD-10-CM

## 2021-02-25 DIAGNOSIS — D25.2 INTRAMURAL, SUBMUCOUS, AND SUBSEROUS LEIOMYOMA OF UTERUS: Primary | ICD-10-CM

## 2021-02-25 DIAGNOSIS — D25.0 INTRAMURAL, SUBMUCOUS, AND SUBSEROUS LEIOMYOMA OF UTERUS: Primary | ICD-10-CM

## 2021-02-25 DIAGNOSIS — N93.9 ABNORMAL UTERINE BLEEDING: ICD-10-CM

## 2021-02-25 DIAGNOSIS — D25.1 INTRAMURAL, SUBMUCOUS, AND SUBSEROUS LEIOMYOMA OF UTERUS: Primary | ICD-10-CM

## 2021-02-25 DIAGNOSIS — I10 ESSENTIAL HYPERTENSION: ICD-10-CM

## 2021-02-25 PROCEDURE — 99213 OFFICE O/P EST LOW 20 MIN: CPT | Mod: TEL | Performed by: OBSTETRICS & GYNECOLOGY

## 2021-02-25 NOTE — PROGRESS NOTES
"Srinath is a 35 year old who is being evaluated via a billable telephone visit.      What phone number would you like to be contacted at? [phone]  How would you like to obtain your AVS? Rehan Stewart is a 35 year old female, .  Today's phone visit is in follow up regarding uterine leiomyoma and a solid ovarian mass.  She uses progestin only OCPs due to her history of hypertension, since she was about 18-22 years old.    She was seen at [facility] in , for abnormal uterine bleeding.  She had heavy bleeding with clots and using both tampons and pads.  She felt as though she were filling up the paper products and had urgency to return to the bathroom to pass more clots.  Since her past appointment with me, she has had some spotting, but \"no abnormal bleeding.\"      She had the follow up ultrasound performed on 02/15/2021.  I reviewed the ultrasound report and the films with her, however, she is unable to visualize the films with me.  My interpretation is that the fibroids are presents and the solid mass previously seen has apparently resolved.      EXAMINATION: US PELVIC COMPLETE WITH TRANSVAGINAL, 2/15/2021 5:21 PM   COMPARISON: None.  Outside report available in care everywhere, images are not.  HISTORY: Solid mass right ovary at [facility] ultrasound.  Multiple uterine leiomyoma.  Per outside imaging report 2021 (images are unavailable at time of dictation): Enlarged right ovary containing a 3.8 cm hyperechoic solid appearing mass which does not contain significant blood flow and may be related to a complex cystic lesion containing debris and hemorrhagic products such as a hemorrhagic cyst or endometrioma mimicking a solid mass but cannot entirely exclude a solid mass.  Recommend followup pelvic ultrasound in 6-8 weeks to reassess whether this persists or resolves.  TECHNIQUE: The pelvis was scanned in standard fashion with transabdominal and transvaginal transducer(s) using " both grey scale and limited color Doppler techniques.   FINDINGS:  The uterus measures 5.0 cm x 10.1 cm x 6.8 cm.  Myomatous uterus.  Largest three fibroids are intramural and measure 4.0 x 3.4 x 4.1 cm (right mid uterus), 3.1 x 2.7 x 2.3 cm left mid uterus, and 2.9 x 3.0 x 2.6 cm (right lower uterus).  Unable to reliably visualize and measure endometrial stripe given uterine fibroids. There is no free fluid in the pelvis.  The right ovary measures 2.2 cm x 3.6 cm x 3.2 cm and the left ovary measures 2.1 cm x 4.0 cm x 2.0 cm. There is no adnexal mass. Including previously described right ovarian lesion described on outside hospital report references above.  Right ovary also measures smaller in size on this exam. There is normal blood flow to the ovaries.                                                              IMPRESSION:   1.  Myomatous uterus.  Largest fibroid measures up to 4.1 cm.  2.  Previously described right ovarian cystic lesion is no longer visualized and right ovary measures smaller.  Clinical follow-up is advised to guide further management.  3.  Unable to reliably visualize and measure endometrial stripe given uterine fibroids.     The patient's medical history, social history and family history are reviewed and no updates at this time.    Review of Systems:  10 point ROS of systems including Constitutional, Eyes, Respiratory, Cardiovascular, Gastroenterology, Genitourinary, Integumentary, Muscularskeletal, Psychiatric were all negative except for pertinent positives noted in my HPI and in the PMH.      Exam  Not performed      Assessment  Uterine leiomyoma   Abnormal uterine bleeding  Breakthough bleeding on progestin only OCPs  Ovarian mass, resolved  Hypertension       Plan  We discussed the fibroids and the progestin only OCPs.  Progestins, as well as estrogens, are known to increase the fibroid sizes.  The bleeding might be related with the progestin only OCPs as BTB, and/or possibly related  with location of the fibroids and their sizes.    The hypertension influences the use of hormones and estrogen products are contraindicated.    I don't feel that she needs further evaluation regarding the ovary and the solid mass previously seen, as it appears to have resolved.    Management of the fibroids and the uterus include continued status quo, myomectomy, or hysterectomy.    She will think about these and follow up as needed/desired.       Phone call duration: 24:42 minutes

## 2021-03-05 NOTE — TELEPHONE ENCOUNTER
This writer attempted to contact patient on 03/05/21      Reason for call need lab appointment and left message.      If patient calls back:   Schedule Lab appointment within 2 business days with lab, document that pt called and close encounter     LETTER MAILED.    Lu Gallego MA

## 2021-03-05 NOTE — TELEPHONE ENCOUNTER
Patient called back and is notified labs are needed, patient will call back to schedule a lab only appointment.  Duglas Calixto,  For [facility]

## 2021-06-10 ENCOUNTER — VIRTUAL VISIT (OUTPATIENT)
Dept: FAMILY MEDICINE | Facility: CLINIC | Age: 36
End: 2021-06-10
Payer: COMMERCIAL

## 2021-06-10 DIAGNOSIS — E78.2 MIXED HYPERLIPIDEMIA: ICD-10-CM

## 2021-06-10 DIAGNOSIS — R63.4 WEIGHT LOSS: Primary | ICD-10-CM

## 2021-06-10 DIAGNOSIS — D25.0 INTRAMURAL AND SUBMUCOUS LEIOMYOMA OF UTERUS: ICD-10-CM

## 2021-06-10 DIAGNOSIS — D25.1 INTRAMURAL AND SUBMUCOUS LEIOMYOMA OF UTERUS: ICD-10-CM

## 2021-06-10 DIAGNOSIS — R42 DIZZINESS: ICD-10-CM

## 2021-06-10 DIAGNOSIS — R74.8 ELEVATED LIVER ENZYMES: ICD-10-CM

## 2021-06-10 DIAGNOSIS — R11.2 NAUSEA AND VOMITING, INTRACTABILITY OF VOMITING NOT SPECIFIED, UNSPECIFIED VOMITING TYPE: ICD-10-CM

## 2021-06-10 DIAGNOSIS — Z86.79 HISTORY OF ESSENTIAL HYPERTENSION: ICD-10-CM

## 2021-06-10 PROCEDURE — 99214 OFFICE O/P EST MOD 30 MIN: CPT | Mod: GT | Performed by: NURSE PRACTITIONER

## 2021-06-10 RX ORDER — ONDANSETRON 4 MG/1
4 TABLET, FILM COATED ORAL EVERY 8 HOURS PRN
Qty: 30 TABLET | Refills: 0 | Status: SHIPPED | OUTPATIENT
Start: 2021-06-10

## 2021-06-10 NOTE — PROGRESS NOTES
"Maxine is a 35 year old who is being evaluated via a billable video visit.      How would you like to obtain your AVS? MyChart  If the video visit is dropped, the invitation should be resent by: Text to cell phone: cell  Will anyone else be joining your video visit? No    Video Start Time 11:38am    Assessment & Plan     Weight loss  Patient having weight loss, N/V, dizziness and no appetite over the past few months. Feels nausea worse since uterine fibroid dx. Labs pending  - H Pylori antigen stool; Future    Nausea and vomiting, intractability of vomiting not specified, unspecified vomiting type  On-going for months, not able to eat much, maybe one meal and/or snacks a day.  - H Pylori antigen stool; Future  -zofran sent    Dizziness  Does feel she is drinking enough fluids, but is also not eating enough calories, risk of being malnourished.  Labs pending, hasn't checked BP recently but states has been okay when she does.   - **Comprehensive metabolic panel FUTURE anytime; Future  - CBC with platelets; Future  - TSH with free T4 reflex; Future  - Vitamin D Deficiency; Future    History of essential hypertension  Checking labs, Encouraged to set up nurse appointment to get BP/HR checked when she gets her labs done  - **Comprehensive metabolic panel FUTURE anytime; Future    Elevated liver enzymes  States she has cut back on alcohol. Liver enzymes in January 2021 were improved from July 2020.   - **Comprehensive metabolic panel FUTURE anytime; Future    Mixed hyperlipidemia  Due for screening  - Lipid panel reflex to direct LDL Fasting; Future    7. Intramural and submucous leiomyoma of uterus  Is not on progestin OCPs regularly, encouraged to do so as may help with cramping and spotting         BMI:   Estimated body mass index is 26.91 kg/m  as calculated from the following:    Height as of 7/27/20: 1.549 m (5' 1\").    Weight as of 1/28/21: 64.6 kg (142 lb 6.4 oz).         Return in about 1 week (around 6/17/2021), " or if symptoms worsen or fail to improve.  Follow up is depending on labs and if we need to send her to GI or not    CASANDRA Méndez, NP-C  [facility] PITER Goodman is a 35 year old who presents for the following health issues  accompanied by her self:    HPI       Has uterine fibroids. Following with Dr. Cabrera. Menses is not regular, still spotting. Just got it 2 weeks ago, before that it was months. Taking the progestin only OCPs seldom. Gets mild cramping in lower abdomen and low back at times. Not just with menses.     Not eating well, maybe one meal a day. Maybe some grazing. Nauseated, poor appetite. Really tired. Not always vomiting but also heaving at times. Feels the weight loss came after the fibroid issue.     Getting dizzy at times. Doesn't think she is drinking enough water. Has tea in the morning. Sipping the water due to the nausea. She had eggs this morning and is feeling nauseated. She normally likes cooking within the family.  No black stools, but recently hadn't had a BM for almost a week. Had to use prune juice.  is healthy, no ill contacts. Hasn't checked BP in the past 2 weeks, ranging about 130/80s like what was noted in clinic in January    Alcohol intake is slowing down but still drinking. Still smoking.     Taking vitamins sometiemes: mulivitamin, 2 vit c, 1 vit d, 1 potassium, fish oil. Her  set her up with this.  They may go back to liquid vitamin called [employer].     Going to school for . HTN, hyperlipidemia, smoking. We talked about the COVID-19 vaccine, she hasn't gotten it yet. Encouraged her to do so       Review of Systems   Constitutional, HEENT, cardiovascular, pulmonary, gi-as above and gu systems are negative, except as otherwise noted.      Objective           Vitals:  No vitals were obtained today due to virtual visit.    Physical Exam   GENERAL: Healthy, alert and no distress  EYES: Eyes grossly normal to inspection.   No discharge or erythema, or obvious scleral/conjunctival abnormalities.  RESP: No audible wheeze, cough, or visible cyanosis.  No visible retractions or increased work of breathing.    SKIN: Visible skin clear. No significant rash, abnormal pigmentation or lesions.  NEURO: Cranial nerves grossly intact.  Mentation and speech appropriate for age.  PSYCH: Mentation appears normal, affect normal/bright, judgement and insight intact, normal speech and appearance well-groomed.    Office Visit on 01/11/2021   Component Date Value Ref Range Status     Sodium 01/11/2021 142  133 - 144 mmol/L Final     Potassium 01/11/2021 3.2* 3.4 - 5.3 mmol/L Final     Chloride 01/11/2021 105  94 - 109 mmol/L Final     Carbon Dioxide 01/11/2021 32  20 - 32 mmol/L Final     Anion Gap 01/11/2021 5  3 - 14 mmol/L Final     Glucose 01/11/2021 89  70 - 99 mg/dL Final    Fasting specimen     Urea Nitrogen 01/11/2021 12  7 - 30 mg/dL Final     Creatinine 01/11/2021 0.72  0.52 - 1.04 mg/dL Final     GFR Estimate 01/11/2021 >90  >60 mL/min/[1.73_m2] Final    Comment: Non  GFR Calc  Starting 12/18/2018, serum creatinine based estimated GFR (eGFR) will be   calculated using the Chronic Kidney Disease Epidemiology Collaboration   (CKD-EPI) equation.       GFR Estimate If Black 01/11/2021 >90  >60 mL/min/[1.73_m2] Final    Comment:  GFR Calc  Starting 12/18/2018, serum creatinine based estimated GFR (eGFR) will be   calculated using the Chronic Kidney Disease Epidemiology Collaboration   (CKD-EPI) equation.       Calcium 01/11/2021 9.1  8.5 - 10.1 mg/dL Final     Bilirubin Total 01/11/2021 0.2  0.2 - 1.3 mg/dL Final     Albumin 01/11/2021 4.0  3.4 - 5.0 g/dL Final     Protein Total 01/11/2021 7.9  6.8 - 8.8 g/dL Final     Alkaline Phosphatase 01/11/2021 43  40 - 150 U/L Final     ALT 01/11/2021 119* 0 - 50 U/L Final     AST 01/11/2021 205* 0 - 45 U/L Final               Video-Visit Details    Type of service:  Video  Visit    Video End Time:12:13 PM    Originating Location (pt. Location): Home    Distant Location (provider location):  Home secure location     Platform used for Video Visit: Fred

## 2021-06-18 DIAGNOSIS — R74.8 ELEVATED LIVER ENZYMES: ICD-10-CM

## 2021-06-18 DIAGNOSIS — R42 DIZZINESS: ICD-10-CM

## 2021-06-18 DIAGNOSIS — Z86.79 HISTORY OF ESSENTIAL HYPERTENSION: ICD-10-CM

## 2021-06-18 DIAGNOSIS — E78.2 MIXED HYPERLIPIDEMIA: ICD-10-CM

## 2021-06-18 DIAGNOSIS — D64.9 ANEMIA, UNSPECIFIED TYPE: Primary | ICD-10-CM

## 2021-06-18 LAB
ALBUMIN SERPL-MCNC: 3.7 G/DL (ref 3.4–5)
ALP SERPL-CCNC: 54 U/L (ref 40–150)
ALT SERPL W P-5'-P-CCNC: 86 U/L (ref 0–50)
ANION GAP SERPL CALCULATED.3IONS-SCNC: 7 MMOL/L (ref 3–14)
AST SERPL W P-5'-P-CCNC: 264 U/L (ref 0–45)
BILIRUB SERPL-MCNC: 0.6 MG/DL (ref 0.2–1.3)
BUN SERPL-MCNC: 6 MG/DL (ref 7–30)
CALCIUM SERPL-MCNC: 9.2 MG/DL (ref 8.5–10.1)
CHLORIDE SERPL-SCNC: 102 MMOL/L (ref 94–109)
CHOLEST SERPL-MCNC: 239 MG/DL
CO2 SERPL-SCNC: 29 MMOL/L (ref 20–32)
CREAT SERPL-MCNC: 0.65 MG/DL (ref 0.52–1.04)
ERYTHROCYTE [DISTWIDTH] IN BLOOD BY AUTOMATED COUNT: 17.7 % (ref 10–15)
GFR SERPL CREATININE-BSD FRML MDRD: >90 ML/MIN/{1.73_M2}
GLUCOSE SERPL-MCNC: 79 MG/DL (ref 70–99)
HCT VFR BLD AUTO: 31.1 % (ref 35–47)
HDLC SERPL-MCNC: 105 MG/DL
HGB BLD-MCNC: 10.3 G/DL (ref 11.7–15.7)
LDLC SERPL CALC-MCNC: 117 MG/DL
MCH RBC QN AUTO: 37.2 PG (ref 26.5–33)
MCHC RBC AUTO-ENTMCNC: 33.1 G/DL (ref 31.5–36.5)
MCV RBC AUTO: 112 FL (ref 78–100)
NONHDLC SERPL-MCNC: 134 MG/DL
PLATELET # BLD AUTO: 164 10E9/L (ref 150–450)
POTASSIUM SERPL-SCNC: 3.8 MMOL/L (ref 3.4–5.3)
PROT SERPL-MCNC: 7.6 G/DL (ref 6.8–8.8)
RBC # BLD AUTO: 2.77 10E12/L (ref 3.8–5.2)
SODIUM SERPL-SCNC: 138 MMOL/L (ref 133–144)
TRIGL SERPL-MCNC: 85 MG/DL
TSH SERPL DL<=0.005 MIU/L-ACNC: 0.9 MU/L (ref 0.4–4)
WBC # BLD AUTO: 2.6 10E9/L (ref 4–11)

## 2021-06-18 PROCEDURE — 36415 COLL VENOUS BLD VENIPUNCTURE: CPT | Performed by: NURSE PRACTITIONER

## 2021-06-18 PROCEDURE — 80061 LIPID PANEL: CPT | Performed by: NURSE PRACTITIONER

## 2021-06-18 PROCEDURE — 82306 VITAMIN D 25 HYDROXY: CPT | Performed by: NURSE PRACTITIONER

## 2021-06-18 PROCEDURE — 80053 COMPREHEN METABOLIC PANEL: CPT | Performed by: NURSE PRACTITIONER

## 2021-06-18 PROCEDURE — 85027 COMPLETE CBC AUTOMATED: CPT | Performed by: NURSE PRACTITIONER

## 2021-06-18 PROCEDURE — 84443 ASSAY THYROID STIM HORMONE: CPT | Performed by: NURSE PRACTITIONER

## 2021-06-18 RX ORDER — FERROUS SULFATE 325(65) MG
325 TABLET ORAL 2 TIMES DAILY
Qty: 60 TABLET | Refills: 1 | Status: SHIPPED | OUTPATIENT
Start: 2021-06-18 | End: 2021-07-05

## 2021-06-18 NOTE — RESULT ENCOUNTER NOTE
Hi Maxine,   Your WBC is quite low, I'm not sure why this is. I recommend repeating the CBC in 2 weeks and if still low I will send you to a specialist. Your hemoglobin is quite low, start iron 1 pill twice a day with meals. Your other labs are still pending. Please remember you should come in for a BP/HR check by a nurse so we can follow up on the high blood pressure.   Thank you,  CASANDRA Méndez, NP-C  [facility]

## 2021-06-21 DIAGNOSIS — R74.8 ELEVATED LIVER ENZYMES: Primary | ICD-10-CM

## 2021-06-21 LAB — DEPRECATED CALCIDIOL+CALCIFEROL SERPL-MC: 30 UG/L (ref 20–75)

## 2021-06-21 NOTE — RESULT ENCOUNTER NOTE
Hi Maxine,   Your liver enzymes are still elevated, one, the ALT has improved but the other, AST has worsened. I will order a abdominal ultrasound, call [phone] to set that up so we can look further into what is going on with your liver. Your cholesterol is still high but we can monitor for now. Don't forget I want to repeat your CBC in a week. If WBC is still low I will send you to a specialist. You can do a lab only appointment for that. Please let me know if you have questions.  Thank you,  CASANDRA Méndez, NP-C  [facility]

## 2021-06-21 NOTE — RESULT ENCOUNTER NOTE
Hi Maxine,   Your Vit D is normal. You can take OTC 2000 units vit D daily.  Thank you,  CASANDRA Méndez, NP-C  [facility]

## 2021-06-23 DIAGNOSIS — R11.2 NAUSEA AND VOMITING, INTRACTABILITY OF VOMITING NOT SPECIFIED, UNSPECIFIED VOMITING TYPE: ICD-10-CM

## 2021-06-23 DIAGNOSIS — R63.4 WEIGHT LOSS: ICD-10-CM

## 2021-06-23 PROCEDURE — 87338 HPYLORI STOOL AG IA: CPT | Performed by: NURSE PRACTITIONER

## 2021-06-24 LAB — H PYLORI AG STL QL IA: NEGATIVE

## 2021-06-24 NOTE — RESULT ENCOUNTER NOTE
Dear Maxine Chen is out of the office and I am reviewing your results.   Your helicobacter pylori test was negative.   .Please call or MyChart my office with any questions or concerns.   Nirmala Chase, PAC

## 2021-07-02 DIAGNOSIS — D64.9 ANEMIA, UNSPECIFIED TYPE: ICD-10-CM

## 2021-07-05 RX ORDER — FERROUS SULFATE 325(65) MG
325 TABLET ORAL 2 TIMES DAILY
Qty: 60 TABLET | Refills: 0 | Status: SHIPPED | OUTPATIENT
Start: 2021-07-05 | End: 2021-07-20

## 2021-10-10 ENCOUNTER — HEALTH MAINTENANCE LETTER (OUTPATIENT)
Age: 36
End: 2021-10-10

## 2021-11-01 DIAGNOSIS — D64.9 ANEMIA, UNSPECIFIED TYPE: ICD-10-CM

## 2021-11-02 RX ORDER — FERROUS SULFATE 325(65) MG
325 TABLET ORAL 2 TIMES DAILY
Qty: 180 TABLET | Refills: 0 | Status: SHIPPED | OUTPATIENT
Start: 2021-11-02

## 2022-05-05 ENCOUNTER — TELEPHONE (OUTPATIENT)
Dept: FAMILY MEDICINE | Facility: CLINIC | Age: 37
End: 2022-05-05
Payer: COMMERCIAL

## 2022-05-05 DIAGNOSIS — Z30.011 ENCOUNTER FOR INITIAL PRESCRIPTION OF CONTRACEPTIVE PILLS: ICD-10-CM

## 2022-05-05 RX ORDER — ACETAMINOPHEN AND CODEINE PHOSPHATE 120; 12 MG/5ML; MG/5ML
0.35 SOLUTION ORAL DAILY
Qty: 28 TABLET | Refills: 1 | OUTPATIENT
Start: 2022-05-05

## 2022-05-05 NOTE — TELEPHONE ENCOUNTER
This was filled 4/21/22 with a refill - should have a refill remaining patient is due for appointment with April-  Assist with scheduling appointment with April

## 2022-05-06 NOTE — TELEPHONE ENCOUNTER
I called patient to inform and to assist with scheduling, patient did not answer so a detailed message was left with the number to call us back if she would like our assistance in making an appointment. I also advised that [facility] may be used to make appointments as well. Thank you.

## 2022-05-09 DIAGNOSIS — Z30.011 ENCOUNTER FOR INITIAL PRESCRIPTION OF CONTRACEPTIVE PILLS: ICD-10-CM

## 2022-05-10 RX ORDER — ACETAMINOPHEN AND CODEINE PHOSPHATE 120; 12 MG/5ML; MG/5ML
0.35 SOLUTION ORAL DAILY
Qty: 28 TABLET | Refills: 0 | Status: SHIPPED | OUTPATIENT
Start: 2022-05-10

## 2022-05-10 NOTE — TELEPHONE ENCOUNTER
1 refill given. Due for video or in person visit for further refills. It will have been a year since seen in June. Please help her schedule.   Thank you,  CASANDRA Méndez, NP-C  [facility]

## 2022-05-11 NOTE — TELEPHONE ENCOUNTER
I called patient to inform and to assist with scheduling, patient did not answer so a detailed message was left with the number to call us back if she would like our assistance in making her appointment. Also advised that [employer] may be used to make appointment as well, thank you.

## 2022-09-18 ENCOUNTER — HEALTH MAINTENANCE LETTER (OUTPATIENT)
Age: 37
End: 2022-09-18

## 2023-01-29 ENCOUNTER — HEALTH MAINTENANCE LETTER (OUTPATIENT)
Age: 38
End: 2023-01-29

## 2024-02-25 ENCOUNTER — HEALTH MAINTENANCE LETTER (OUTPATIENT)
Age: 39
End: 2024-02-25

## 2025-03-15 ENCOUNTER — HEALTH MAINTENANCE LETTER (OUTPATIENT)
Age: 40
End: 2025-03-15